# Patient Record
Sex: FEMALE | NOT HISPANIC OR LATINO | Employment: UNEMPLOYED | URBAN - METROPOLITAN AREA
[De-identification: names, ages, dates, MRNs, and addresses within clinical notes are randomized per-mention and may not be internally consistent; named-entity substitution may affect disease eponyms.]

---

## 2022-07-26 ENCOUNTER — TRANSCRIBE ORDERS (OUTPATIENT)
Dept: ADMINISTRATIVE | Facility: HOSPITAL | Age: 62
End: 2022-07-26

## 2022-07-26 DIAGNOSIS — Z13.820 SCREENING FOR OSTEOPOROSIS: Primary | ICD-10-CM

## 2022-08-01 ENCOUNTER — TRANSCRIBE ORDERS (OUTPATIENT)
Dept: ADMINISTRATIVE | Facility: HOSPITAL | Age: 62
End: 2022-08-01

## 2022-08-01 DIAGNOSIS — H90.5 SENSORINEURAL HEARING LOSS (SNHL) OF RIGHT EAR, UNSPECIFIED HEARING STATUS ON CONTRALATERAL SIDE: Primary | ICD-10-CM

## 2022-08-22 ENCOUNTER — HOSPITAL ENCOUNTER (OUTPATIENT)
Dept: BONE DENSITY | Facility: HOSPITAL | Age: 62
Discharge: HOME | End: 2022-08-22
Admitting: NURSE PRACTITIONER

## 2022-08-22 DIAGNOSIS — Z13.820 SCREENING FOR OSTEOPOROSIS: ICD-10-CM

## 2022-08-22 PROCEDURE — 77080 DXA BONE DENSITY AXIAL: CPT

## 2022-09-07 ENCOUNTER — APPOINTMENT (OUTPATIENT)
Dept: CT IMAGING | Facility: HOSPITAL | Age: 62
End: 2022-09-07

## 2022-09-19 ENCOUNTER — HOSPITAL ENCOUNTER (OUTPATIENT)
Dept: CT IMAGING | Facility: HOSPITAL | Age: 62
Discharge: HOME | End: 2022-09-19
Admitting: OTOLARYNGOLOGY

## 2022-09-19 DIAGNOSIS — H90.5 SENSORINEURAL HEARING LOSS (SNHL) OF RIGHT EAR, UNSPECIFIED HEARING STATUS ON CONTRALATERAL SIDE: ICD-10-CM

## 2022-09-19 PROCEDURE — 70480 CT ORBIT/EAR/FOSSA W/O DYE: CPT

## 2022-12-27 ENCOUNTER — TRANSCRIBE ORDERS (OUTPATIENT)
Dept: ADMINISTRATIVE | Facility: HOSPITAL | Age: 62
End: 2022-12-27

## 2022-12-27 DIAGNOSIS — Z12.2 SCREENING FOR LUNG CANCER: Primary | ICD-10-CM

## 2023-01-09 ENCOUNTER — HOSPITAL ENCOUNTER (OUTPATIENT)
Dept: CT IMAGING | Facility: HOSPITAL | Age: 63
Discharge: HOME | End: 2023-01-09
Admitting: NURSE PRACTITIONER
Payer: MEDICARE

## 2023-01-09 DIAGNOSIS — Z12.2 SCREENING FOR LUNG CANCER: ICD-10-CM

## 2023-01-09 PROCEDURE — 71271 CT THORAX LUNG CANCER SCR C-: CPT

## 2023-04-18 ENCOUNTER — OFFICE VISIT (OUTPATIENT)
Dept: SLEEP MEDICINE | Facility: HOSPITAL | Age: 63
End: 2023-04-18
Payer: MEDICARE

## 2023-04-18 VITALS
DIASTOLIC BLOOD PRESSURE: 92 MMHG | SYSTOLIC BLOOD PRESSURE: 134 MMHG | WEIGHT: 226.6 LBS | OXYGEN SATURATION: 96 % | HEART RATE: 97 BPM | BODY MASS INDEX: 41.7 KG/M2 | HEIGHT: 62 IN

## 2023-04-18 DIAGNOSIS — E11.649 TYPE 2 DIABETES MELLITUS WITH HYPOGLYCEMIA WITHOUT COMA, WITH LONG-TERM CURRENT USE OF INSULIN: ICD-10-CM

## 2023-04-18 DIAGNOSIS — Z79.4 TYPE 2 DIABETES MELLITUS WITH HYPOGLYCEMIA WITHOUT COMA, WITH LONG-TERM CURRENT USE OF INSULIN: ICD-10-CM

## 2023-04-18 DIAGNOSIS — G47.34 SLEEP RELATED HYPOXIA: ICD-10-CM

## 2023-04-18 DIAGNOSIS — I10 BENIGN ESSENTIAL HTN: ICD-10-CM

## 2023-04-18 DIAGNOSIS — G47.33 OSA ON CPAP: Primary | ICD-10-CM

## 2023-04-18 DIAGNOSIS — J45.909 UNCOMPLICATED ASTHMA, UNSPECIFIED ASTHMA SEVERITY, UNSPECIFIED WHETHER PERSISTENT: ICD-10-CM

## 2023-04-18 DIAGNOSIS — Z99.89 OSA ON CPAP: Primary | ICD-10-CM

## 2023-04-18 DIAGNOSIS — E66.01 OBESITY, MORBID, BMI 40.0-49.9: ICD-10-CM

## 2023-04-18 PROCEDURE — G0463 HOSPITAL OUTPT CLINIC VISIT: HCPCS

## 2023-04-18 PROCEDURE — 3075F SYST BP GE 130 - 139MM HG: CPT | Performed by: INTERNAL MEDICINE

## 2023-04-18 PROCEDURE — 3080F DIAST BP >= 90 MM HG: CPT | Performed by: INTERNAL MEDICINE

## 2023-04-18 RX ORDER — CHLORAL HYDRATE 500 MG
CAPSULE ORAL
COMMUNITY

## 2023-04-18 RX ORDER — DIPHENHYDRAMINE HCL 25 MG
1 CAPSULE ORAL EVERY 6 HOURS PRN
COMMUNITY

## 2023-04-18 RX ORDER — DULOXETIN HYDROCHLORIDE 30 MG/1
1 CAPSULE, DELAYED RELEASE ORAL DAILY
COMMUNITY

## 2023-04-18 RX ORDER — ALBUTEROL SULFATE 90 UG/1
2 AEROSOL, METERED RESPIRATORY (INHALATION) EVERY 6 HOURS PRN
COMMUNITY
Start: 2023-03-22

## 2023-04-18 RX ORDER — MELATONIN
25 DAILY
COMMUNITY

## 2023-04-18 RX ORDER — GLIPIZIDE 10 MG/1
10 TABLET ORAL
COMMUNITY
Start: 2023-03-28

## 2023-04-18 RX ORDER — LOSARTAN POTASSIUM 100 MG/1
1 TABLET ORAL DAILY
COMMUNITY
Start: 2023-04-01

## 2023-04-18 RX ORDER — BUDESONIDE 0.5 MG/2ML
0.5 INHALANT ORAL
COMMUNITY
Start: 2022-11-15

## 2023-04-18 RX ORDER — UBIDECARENONE 75 MG
CAPSULE ORAL
COMMUNITY

## 2023-04-18 RX ORDER — IPRATROPIUM BROMIDE AND ALBUTEROL SULFATE 2.5; .5 MG/3ML; MG/3ML
SOLUTION RESPIRATORY (INHALATION)
COMMUNITY
Start: 2023-01-09

## 2023-04-18 RX ORDER — TIOTROPIUM BROMIDE 18 UG/1
CAPSULE ORAL; RESPIRATORY (INHALATION) DAILY
COMMUNITY
Start: 2023-04-05

## 2023-04-18 RX ORDER — TRAZODONE HYDROCHLORIDE 100 MG/1
100 TABLET ORAL
COMMUNITY
Start: 2023-03-29

## 2023-04-18 RX ORDER — FLUOXETINE HYDROCHLORIDE 20 MG/1
20 CAPSULE ORAL EVERY MORNING
COMMUNITY
Start: 2023-02-09

## 2023-04-18 RX ORDER — CYCLOBENZAPRINE HCL 10 MG
10 TABLET ORAL
COMMUNITY
Start: 2023-04-06 | End: 2023-05-06

## 2023-04-18 RX ORDER — GOLIMUMAB 100 MG/ML
INJECTION, SOLUTION SUBCUTANEOUS
COMMUNITY

## 2023-04-18 RX ORDER — DENOSUMAB 60 MG/ML
INJECTION SUBCUTANEOUS
COMMUNITY
Start: 2023-01-16

## 2023-04-18 RX ORDER — LOVASTATIN 20 MG/1
1 TABLET ORAL DAILY
Qty: 30 TABLET | Refills: 11 | COMMUNITY
Start: 2022-09-07 | End: 2023-09-07

## 2023-04-18 RX ORDER — GABAPENTIN 300 MG/1
CAPSULE ORAL
COMMUNITY
Start: 2023-02-08

## 2023-04-18 RX ORDER — LORATADINE 10 MG/1
10 TABLET ORAL DAILY
COMMUNITY

## 2023-04-18 RX ORDER — OMEPRAZOLE 20 MG/1
1 CAPSULE, DELAYED RELEASE ORAL DAILY
COMMUNITY
Start: 2023-03-28

## 2023-04-18 RX ORDER — CLONAZEPAM 0.5 MG/1
0.5 TABLET ORAL DAILY PRN
COMMUNITY
Start: 2023-04-12

## 2023-04-18 RX ORDER — BUDESONIDE AND FORMOTEROL FUMARATE DIHYDRATE 160; 4.5 UG/1; UG/1
2 AEROSOL RESPIRATORY (INHALATION)
COMMUNITY
Start: 2023-01-24

## 2023-04-18 NOTE — PROGRESS NOTES
Sleep Consultation    Patient Name: Estelle Ruby  Age/Sex: 62 y.o. female  : 1960  MRN: [id number]    Date of Encounter Visit: 2023  Encounter Provider: Gael Villa MD  Referring Provider: ELEUTERIO Ng  Place of Service: [facility]  Patient Care Team:  Elizabeth Soriano APRN as PCP - General (Nurse Practitioner)    Subjective:     Reason for Consult: Obstructive sleep apnea    History of Present Illness:  Estelle Ruby is a 62 y.o. female is here for evaluation of KRISTOFER due to suggestive symptoms.  She was evaluated for KRISTOFER in . And repeated in , 2017 while in [place] and was on Auto CPAP   I had a copy of the 2017 HST that showed AHI of 40 with sleep related hypoxemia with 25% of the recording time spent below 90%  On Auto CPAP 5-15   She did gain 10 pounds since her last home sleep study   Patient complains of daytime fatigue and sleepiness with an [place] Sleepiness Scale (ESS) of 5.  Patient complains of daytime fatigue, subjective sleepiness, with difficulty driving because of the sleepiness  The snoring is very loud, in all position, and with witnessed apnea and episode of waking up choking at night.  Patient has some leg jerking at night but no restless leg syndrome reported  Positive night sweats  Positive for some vivid dreams  Sleep is fragmented with difficulty initiating and maintaining sleep.  Nocturia was 2 trip to the bathroom on the average per night  Denies any symptoms of restless leg syndrome.   Patient denies any cataplexy, sleep paralysis or other symptoms to suggest narcolepsy.  Patient denies any parasomnias.  Denies any history of seizure disorder or recent head trauma.  Patient spends adequate amount of time in bed with no evidence of sleep restriction or improper sleep hygiene.  Bedtime is around midnight wake up time is 8 in the morning with sleep onset within 20 minutes, patient wakes up refreshed.  Patient consumes 2 caffeinated beverages  per day, positive for tobacco until the age of 48  No alcohol consumption  Comorbidities include: Hypertension, anxiety and depression, insomnia, diabetes mellitus, asthma/COPD, acid reflux, arthritis    Review of Systems:   A twelve-system review was conducted and was negative except for the following: Frequent urination, hoarseness, nasal congestion and postnasal drainage, temporomandibular joint pain, anxiety and depression, heartburn, excessive thirst and swelling.        Past Medical History:  Past Medical History:   Diagnosis Date   • Acid reflux    • Arthritis    • Asthma    • Diabetes mellitus    • Hypertension    • Mental disorder     Anxiety, Depression       History reviewed. No pertinent surgical history.    Home Medications:     Current Outpatient Medications:   •  albuterol sulfate  (90 Base) MCG/ACT inhaler, Inhale 2 puffs Every 6 (Six) Hours As Needed., Disp: , Rfl:   •  ascorbic acid (VITAMIN C) 1000 MG tablet, Take 1 tablet by mouth Daily., Disp: , Rfl:   •  budesonide (PULMICORT) 0.5 MG/2ML nebulizer solution, Inhale 2 mL., Disp: , Rfl:   •  budesonide-formoterol (SYMBICORT) 160-4.5 MCG/ACT inhaler, Inhale 2 puffs., Disp: , Rfl:   •  Calcium-Magnesium-Vitamin D - MG-MG-UNIT tablet sustained-release 24 hour, Take  by mouth., Disp: , Rfl:   •  Cariprazine HCl (VRAYLAR) 1.5 MG capsule capsule, Take 1 capsule by mouth Daily., Disp: , Rfl:   •  cholecalciferol (VITAMIN D3) 25 MCG (1000 UT) tablet, Take 1 tablet by mouth Daily., Disp: , Rfl:   •  clonazePAM (KlonoPIN) 0.5 MG tablet, Take 1 tablet by mouth Daily As Needed., Disp: , Rfl:   •  cyclobenzaprine (FLEXERIL) 10 MG tablet, Take 1 tablet by mouth., Disp: , Rfl:   •  diphenhydrAMINE (BENADRYL) 25 mg capsule, Take 1 capsule by mouth Every 6 (Six) Hours As Needed., Disp: , Rfl:   •  DULoxetine (CYMBALTA) 30 MG capsule, Take 1 capsule by mouth Daily., Disp: , Rfl:   •  FLUoxetine (PROzac) 20 MG capsule, Take 1 capsule by mouth  "Every Morning., Disp: , Rfl:   •  fluticasone (VERAMYST) 27.5 MCG/SPRAY nasal spray, 2 sprays into the nostril(s) as directed by provider Daily., Disp: , Rfl:   •  gabapentin (NEURONTIN) 300 MG capsule, TAKE 1 CAPSULE BY MOUTH TWICE DAILY. MAX DAILY AMOUNT: 600 MG, Disp: , Rfl:   •  glipizide (GLUCOTROL) 10 MG tablet, Take 1 tablet by mouth 2 (Two) Times a Day Before Meals., Disp: , Rfl:   •  Golimumab (Simponi) 100 MG/ML solution auto-injector, Inject  under the skin into the appropriate area as directed., Disp: , Rfl:   •  insulin NPH (humuLIN N,novoLIN N) 100 UNIT/ML injection, Inject  under the skin into the appropriate area as directed., Disp: , Rfl:   •  ipratropium-albuterol (DUO-NEB) 0.5-2.5 mg/3 ml nebulizer, , Disp: , Rfl:   •  IRON, FERROUS SULFATE, PO, Take  by mouth., Disp: , Rfl:   •  loratadine (CLARITIN) 10 MG tablet, Take 1 tablet by mouth Daily., Disp: , Rfl:   •  losartan (COZAAR) 100 MG tablet, Take 1 tablet by mouth Daily., Disp: , Rfl:   •  lovastatin (MEVACOR) 20 MG tablet, Take 1 tablet by mouth Daily., Disp: 30 tablet, Rfl: 11  •  MAGNESIUM PO, Take  by mouth., Disp: , Rfl:   •  metFORMIN (GLUCOPHAGE) 1000 MG tablet, Take 1 tablet by mouth 2 (Two) Times a Day With Meals., Disp: , Rfl:   •  Omega-3 Fatty Acids (fish oil) 1000 MG capsule capsule, Take  by mouth., Disp: , Rfl:   •  omeprazole (priLOSEC) 20 MG capsule, Take 1 capsule by mouth Daily., Disp: , Rfl:   •  Prolia 60 MG/ML solution prefilled syringe syringe, , Disp: , Rfl:   •  Spiriva HandiHaler 18 MCG per inhalation capsule, Place  into inhaler and inhale Daily., Disp: , Rfl:   •  traZODone (DESYREL) 100 MG tablet, Take 1 tablet by mouth every night at bedtime., Disp: , Rfl:   •  vitamin B-12 (CYANOCOBALAMIN) 100 MCG tablet, Take  by mouth., Disp: , Rfl:     Allergies:  Allergies   Allergen Reactions   • Sulfa Antibiotics Other (See Comments)     Pt reports \"really bad yeast infection\"       Past Social History:  Social History " "    Socioeconomic History   • Marital status: Unknown   Tobacco Use   • Smoking status: Former     Packs/day: 1.00     Types: Cigarettes   • Smokeless tobacco: Never   Vaping Use   • Vaping Use: Never used   Substance and Sexual Activity   • Alcohol use: Never   • Drug use: Not Currently   • Sexual activity: Defer       Past Family History:  History reviewed. No pertinent family history.  Obesity and hypothyroidism  Objective:        Vital Signs:   Visit Vitals  /92   Pulse 97   Ht 157.5 cm (62\")   Wt 103 kg (226 lb 9.6 oz)   SpO2 96%   BMI 41.45 kg/m²     Wt Readings from Last 3 Encounters:   04/18/23 103 kg (226 lb 9.6 oz)     Neck Circumference: 16 inches    Physical Exam:   GEN:  No acute distress, alert, cooperative, well developed   EYES:   Sclerae clear. No icterus. PERRL. Normal EOM  ENT:   External ears/nose normal, no oral lesions, no thrush, mucous membranes moist, Septum midline. Mallampati IV airway. Large tongue, large uvula , short obese neck   NECK:  Supple, midline trachea, no JVD  LUNGS: Normal chest on inspection, CTAB, no wheezes. No rhonchi. No crackles. Respirations regular, even and unlabored.   CV:  Regular rhythm and rate. Normal S1/S2. No murmurs, gallops, or rubs noted.  ABD:  Soft, nontender and nondistended. Normal bowel sounds. No guarding  EXT:  Moves all extremities well. No cyanosis. No redness. Trace  edema.   Skin: Dry, intact, no bleeding      Diagnostic Data:  HST 9/28/2017 : AHI 40.2, 25% recording time below 90% saturation   Compliance download 5-15 , average nightly use 9.27 hours, residual AHI 0.5, average air leak 32 lpm   Assessment and Plan:       ICD-10-CM ICD-9-CM   1. KRISTOFER on CPAP  G47.33 327.23    Z99.89 V46.8   2. Sleep related hypoxia  G47.34 327.24   3. Obesity, morbid, BMI 40.0-49.9  E66.01 278.01   4. Uncomplicated asthma, unspecified asthma severity, unspecified whether persistent  J45.909 493.90   5. Type 2 diabetes mellitus with hypoglycemia without coma, " with long-term current use of insulin  E11.649 250.80    Z79.4 251.2     V58.67   6. Benign essential HTN  I10 401.1       Recommendations:        Patient has severe case of obstructive sleep apnea with hypoxemia  Patient is on CPAP and it is well controlled on the CPAP, she is compliant with the treatment and it is effective, she is feeling better and she plans to continue to use it  No pressure adjustment necessary  Patient is to have order for CPAP supplies which will be provided today  Patient is to work on the weight loss  We will follow-up on a yearly basis or sooner as needed       Orders Placed This Encounter   Procedures   • PAP Therapy     No orders of the defined types were placed in this encounter.     Return in about 1 year (around 4/18/2024).    Gael Villa MD   [facility]   04/18/23  15:12 EDT    Dictated utilizing Dragon dictation        Plan

## 2023-09-27 ENCOUNTER — OFFICE VISIT (OUTPATIENT)
Dept: CARDIOLOGY | Facility: CLINIC | Age: 63
End: 2023-09-27
Payer: MEDICARE

## 2023-09-27 VITALS
BODY MASS INDEX: 41.04 KG/M2 | SYSTOLIC BLOOD PRESSURE: 117 MMHG | WEIGHT: 223 LBS | HEART RATE: 71 BPM | HEIGHT: 62 IN | DIASTOLIC BLOOD PRESSURE: 64 MMHG

## 2023-09-27 DIAGNOSIS — I10 BENIGN ESSENTIAL HTN: ICD-10-CM

## 2023-09-27 DIAGNOSIS — I50.32 CHRONIC DIASTOLIC HEART FAILURE: Primary | ICD-10-CM

## 2023-09-27 RX ORDER — METOPROLOL SUCCINATE 50 MG/1
50 TABLET, EXTENDED RELEASE ORAL EVERY EVENING
Qty: 90 TABLET | Refills: 3 | Status: SHIPPED | OUTPATIENT
Start: 2023-09-27

## 2023-09-27 RX ORDER — FOLIC ACID 1 MG/1
1 TABLET ORAL DAILY
Qty: 30 TABLET | Refills: 3 | COMMUNITY
Start: 2023-08-03 | End: 2023-11-29

## 2023-09-27 RX ORDER — BUMETANIDE 1 MG/1
1 TABLET ORAL DAILY
Qty: 90 TABLET | Refills: 3 | Status: SHIPPED | OUTPATIENT
Start: 2023-09-27 | End: 2024-09-21

## 2023-09-27 RX ORDER — BUMETANIDE 1 MG/1
1 TABLET ORAL DAILY
Qty: 30 TABLET | Refills: 3 | COMMUNITY
Start: 2023-08-03 | End: 2023-09-27 | Stop reason: SDUPTHER

## 2023-09-27 RX ORDER — POTASSIUM CHLORIDE 750 MG/1
10 TABLET, FILM COATED, EXTENDED RELEASE ORAL 2 TIMES DAILY
COMMUNITY
End: 2023-09-27

## 2023-09-27 RX ORDER — CYCLOBENZAPRINE HCL 10 MG
1 TABLET ORAL 3 TIMES DAILY PRN
COMMUNITY
Start: 2023-06-25

## 2023-09-27 RX ORDER — METOPROLOL SUCCINATE 50 MG/1
50 TABLET, EXTENDED RELEASE ORAL EVERY EVENING
COMMUNITY
End: 2023-09-27 | Stop reason: SDUPTHER

## 2023-09-27 NOTE — ASSESSMENT & PLAN NOTE
Recent admission for heart failure exacerbation.  Labs done as outpatient showed normal proBNP.  Echocardiogram showed normal LV function and SPECT stress test did not show reversible ischemia.  Findings of the test and nature of the condition explained in detail with the patient.  Since recent labs showed high potassium levels, we will discontinue potassium supplementation.  Recommend to continue Bumex 1 mg daily along with metoprolol succinate 50 mg daily.  Of note, she was noted to have nonsustained ventricular tachycardia during recent hospital stay hence it is important to continue metoprolol for now.  After stopping potassium, she will need repeat basic metabolic panel in 2 weeks, and patient wants to do it with her PCP visit.  We will follow the results and make further adjustments.

## 2023-09-27 NOTE — LETTER
September 27, 2023     ELEUTERIO Ng  [address]  [place] [address]    Patient: Estelle Ruby   YOB: 1960   Date of Visit: 9/27/2023       Dear ELEUTERIO Ng    Estelle Ruby was in my office today. Below is a copy of my note.    If you have questions, please do not hesitate to call me. I look forward to following Estelle along with you.         Sincerely,        Bassem Rodriguez MD        CC: ELEUTERIO Ng      CARDIOLOGY INITIAL CONSULT       Chief Complaint  Shortness of Breath and Congestive Heart Failure      Reason for consultation  Congestive heart failure, unspecified heart failure chronicity, unspecified heart failure type    Subjective            Estelle Ruby presents to [facility] CARDIOLOGY  History of Present Illness    Ms. Ruby is referred for cardiac evaluation due to recent diagnosis of congestive heart failure.  She has COPD/asthma and obstructive sleep apnea.  She presented to [facility] in early August after getting an infusion for osteoporosis.  Per patient, she had an allergic reaction, resulting in shortness of breath.  She was noted to be volume overloaded.  She was admitted and was started on IV diuretics.  proBNP was elevated.  Echocardiogram showed diastolic dysfunction.  Symptoms improved and she was eventually discharged home on Bumex, potassium and metoprolol.  She had repeat labs done with PCP office which showed normal proBNP since discharge.    Patient reports that she had low sodium levels for the past several years, which is being closely monitored.  Currently she feels back to normal.  She has mild shortness of breath with activities which is at her baseline.  Pedal edema completely subsided.  Denies any recent episodes of chest pain.      Past History:    COPD/asthma  Obstructive sleep apnea on CPAP  Mixed hyperlipidemia  Essential hypertension  Diabetes mellitus    Medical History:  Past Medical History:    Diagnosis Date   • Acid reflux    • Arthritis    • Asthma    • Diabetes mellitus    • Hypertension    • Mental disorder     Anxiety, Depression       Surgical History: has no past surgical history on file.     Family History: family history includes Cancer in her father; Stroke in her mother.     Social History: reports that she has quit smoking. Her smoking use included cigarettes. She smoked an average of 1 pack per day. She has never used smokeless tobacco. She reports that she does not currently use drugs. She reports that she does not drink alcohol.    Allergies: Sulfa antibiotics    Current Outpatient Medications on File Prior to Visit   Medication Sig   • albuterol sulfate  (90 Base) MCG/ACT inhaler Inhale 2 puffs Every 6 (Six) Hours As Needed.   • ascorbic acid (VITAMIN C) 1000 MG tablet Take 1 tablet by mouth Daily.   • budesonide (PULMICORT) 0.5 MG/2ML nebulizer solution Inhale 2 mL.   • budesonide-formoterol (SYMBICORT) 160-4.5 MCG/ACT inhaler Inhale 2 puffs.   • Calcium-Magnesium-Vitamin D - MG-MG-UNIT tablet sustained-release 24 hour Take  by mouth.   • Cariprazine HCl (VRAYLAR) 1.5 MG capsule capsule Take 1 capsule by mouth Daily.   • cholecalciferol (VITAMIN D3) 25 MCG (1000 UT) tablet Take 1 tablet by mouth Daily.   • clonazePAM (KlonoPIN) 0.5 MG tablet Take 1 tablet by mouth Daily As Needed.   • cyclobenzaprine (FLEXERIL) 10 MG tablet Take 1 tablet by mouth 3 (Three) Times a Day As Needed.   • diphenhydrAMINE (BENADRYL) 25 mg capsule Take 1 capsule by mouth Every 6 (Six) Hours As Needed.   • DULoxetine (CYMBALTA) 30 MG capsule Take 1 capsule by mouth Daily.   • FLUoxetine (PROzac) 20 MG capsule Take 1 capsule by mouth Every Morning.   • fluticasone (VERAMYST) 27.5 MCG/SPRAY nasal spray 2 sprays into the nostril(s) as directed by provider Daily.   • folic acid (FOLVITE) 1 MG tablet Take 1 tablet by mouth Daily.   • gabapentin (NEURONTIN) 300 MG capsule TAKE 1 CAPSULE BY MOUTH  "TWICE DAILY. MAX DAILY AMOUNT: 600 MG   • glipizide (GLUCOTROL) 10 MG tablet Take 1 tablet by mouth 2 (Two) Times a Day Before Meals.   • insulin NPH (humuLIN N,novoLIN N) 100 UNIT/ML injection Inject  under the skin into the appropriate area as directed.   • IRON, FERROUS SULFATE, PO Take  by mouth.   • losartan (COZAAR) 100 MG tablet Take 1 tablet by mouth Daily.   • MAGNESIUM PO Take  by mouth.   • metFORMIN (GLUCOPHAGE) 1000 MG tablet Take 1 tablet by mouth 2 (Two) Times a Day With Meals.   • Omega-3 Fatty Acids (fish oil) 1000 MG capsule capsule Take  by mouth.   • omeprazole (priLOSEC) 20 MG capsule Take 1 capsule by mouth Daily.   • Prolia 60 MG/ML solution prefilled syringe syringe    • traZODone (DESYREL) 100 MG tablet Take 1 tablet by mouth every night at bedtime.   • vitamin B-12 (CYANOCOBALAMIN) 100 MCG tablet Take  by mouth.   • metoprolol succinate XL (TOPROL-XL) 50 MG 24 hr tablet Take 1 tablet by mouth Every Evening.   • potassium chloride 10 MEQ CR tablet Take 1 tablet by mouth 2 (Two) Times a Day.       Review of Systems   Constitutional:  Positive for fatigue. Negative for unexpected weight gain and unexpected weight loss.   Eyes:  Negative for double vision.   Respiratory:  Positive for shortness of breath. Negative for cough and wheezing.    Cardiovascular:  Negative for chest pain, palpitations and leg swelling.   Gastrointestinal:  Negative for abdominal pain, nausea and vomiting.   Endocrine: Negative for cold intolerance, heat intolerance, polydipsia and polyuria.   Musculoskeletal:  Negative for arthralgias and back pain.   Skin:  Negative for color change.   Neurological:  Negative for dizziness, syncope, weakness and headache.   Hematological:  Does not bruise/bleed easily.      Objective     /64   Pulse 71   Ht 157.5 cm (62\")   Wt 101 kg (223 lb)   BMI 40.79 kg/m²       Physical Exam  Constitutional:       General: She is awake. She is not in acute distress.     Appearance: " Normal appearance.   Eyes:      Extraocular Movements: Extraocular movements intact.      Pupils: Pupils are equal, round, and reactive to light.   Neck:      Thyroid: No thyromegaly.      Vascular: No carotid bruit or JVD.   Cardiovascular:      Rate and Rhythm: Normal rate and regular rhythm.      Chest Wall: PMI is not displaced.      Heart sounds: Normal heart sounds, S1 normal and S2 normal. No murmur heard.    No friction rub. No gallop. No S3 or S4 sounds.   Pulmonary:      Effort: Pulmonary effort is normal. No respiratory distress.      Breath sounds: Normal breath sounds. No wheezing, rhonchi or rales.   Abdominal:      General: Bowel sounds are normal.      Palpations: Abdomen is soft.      Tenderness: There is no abdominal tenderness.   Musculoskeletal:      Cervical back: Neck supple.      Right lower leg: No edema.      Left lower leg: No edema.   Skin:     Nails: There is no clubbing.   Neurological:      General: No focal deficit present.      Mental Status: She is alert and oriented to person, place, and time.         Result Review :     The following data was reviewed by: Bassem Rodriguez MD on 09/27/2023:    CMP          4/21/2023    11:10   CMP   Potassium 5.3          Details          This result is from an external source.             CBC          4/17/2023    09:56 7/17/2023    09:44 8/10/2023    13:41   CBC   WBC 9.09     10.16     14.89       RBC 4.37     4.48     4.81       Hemoglobin 11.5     11.9     12.7       Hematocrit 38.1     37.6     41.5       MCV 87.2     83.9     86.3       MCH 26.3     26.6     26.4       MCHC 30.2     31.6     30.6       RDW 15.4     14.6     15.4       Platelets 336     319     509          Details          This result is from an external source.             TSH          7/30/2023    06:18   TSH   TSH 0.596          Details          This result is from an external source.                  Data reviewed: Cardiology studies            SPECT stress test done on  9/11/2023 at [facility] showed    Myocardial perfusion imaging is normal  No evidence of ischemia or infarct  Normal LV dimensions  Normal systolic LV function with estimated LV ejection fraction 73%              Assessment and Plan        Diagnoses and all orders for this visit:    1. Chronic diastolic heart failure (Primary)  Assessment & Plan:  Recent admission for heart failure exacerbation.  Labs done as outpatient showed normal proBNP.  Echocardiogram showed normal LV function and SPECT stress test did not show reversible ischemia.  Findings of the test and nature of the condition explained in detail with the patient.  Since recent labs showed high potassium levels, we will discontinue potassium supplementation.  Recommend to continue Bumex 1 mg daily along with metoprolol succinate 50 mg daily.  Of note, she was noted to have nonsustained ventricular tachycardia during recent hospital stay hence it is important to continue metoprolol for now.  After stopping potassium, she will need repeat basic metabolic panel in 2 weeks, and patient wants to do it with her PCP visit.  We will follow the results and make further adjustments.    Orders:  -     bumetanide (BUMEX) 1 MG tablet; Take 1 tablet by mouth Daily for 360 days.  Dispense: 90 tablet; Refill: 3    2. Benign essential HTN  Assessment & Plan:  Blood pressure very well controlled.  Continue metoprolol and losartan at the current dose.    Orders:  -     metoprolol succinate XL (TOPROL-XL) 50 MG 24 hr tablet; Take 1 tablet by mouth Every Evening.  Dispense: 90 tablet; Refill: 3            I spent 28 minutes caring for Estelle on this date of service. This time includes time spent by me in the following activities:reviewing tests, obtaining and/or reviewing a separately obtained history, performing a medically appropriate examination and/or evaluation , ordering medications, tests, or procedures, and documenting information in the medical record    Follow Up      Return in about 3 months (around 12/27/2023) for Next scheduled follow up.    Patient was given instructions and counseling regarding her condition or for health maintenance advice. Please see specific information pulled into the AVS if appropriate.

## 2023-09-27 NOTE — PROGRESS NOTES
CARDIOLOGY INITIAL CONSULT       Chief Complaint  Shortness of Breath and Congestive Heart Failure      Reason for consultation  Congestive heart failure, unspecified heart failure chronicity, unspecified heart failure type    Subjective            Estelle Ruby presents to [facility] CARDIOLOGY  History of Present Illness    Ms. Ruby is referred for cardiac evaluation due to recent diagnosis of congestive heart failure.  She has COPD/asthma and obstructive sleep apnea.  She presented to [facility] in early August after getting an infusion for osteoporosis.  Per patient, she had an allergic reaction, resulting in shortness of breath.  She was noted to be volume overloaded.  She was admitted and was started on IV diuretics.  proBNP was elevated.  Echocardiogram showed diastolic dysfunction.  Symptoms improved and she was eventually discharged home on Bumex, potassium and metoprolol.  She had repeat labs done with PCP office which showed normal proBNP since discharge.    Patient reports that she had low sodium levels for the past several years, which is being closely monitored.  Currently she feels back to normal.  She has mild shortness of breath with activities which is at her baseline.  Pedal edema completely subsided.  Denies any recent episodes of chest pain.      Past History:    COPD/asthma  Obstructive sleep apnea on CPAP  Mixed hyperlipidemia  Essential hypertension  Diabetes mellitus    Medical History:  Past Medical History:   Diagnosis Date    Acid reflux     Arthritis     Asthma     Diabetes mellitus     Hypertension     Mental disorder     Anxiety, Depression       Surgical History: has no past surgical history on file.     Family History: family history includes Cancer in her father; Stroke in her mother.     Social History: reports that she has quit smoking. Her smoking use included cigarettes. She smoked an average of 1 pack per day. She has never used smokeless tobacco. She  reports that she does not currently use drugs. She reports that she does not drink alcohol.    Allergies: Sulfa antibiotics    Current Outpatient Medications on File Prior to Visit   Medication Sig    albuterol sulfate  (90 Base) MCG/ACT inhaler Inhale 2 puffs Every 6 (Six) Hours As Needed.    ascorbic acid (VITAMIN C) 1000 MG tablet Take 1 tablet by mouth Daily.    budesonide (PULMICORT) 0.5 MG/2ML nebulizer solution Inhale 2 mL.    budesonide-formoterol (SYMBICORT) 160-4.5 MCG/ACT inhaler Inhale 2 puffs.    Calcium-Magnesium-Vitamin D - MG-MG-UNIT tablet sustained-release 24 hour Take  by mouth.    Cariprazine HCl (VRAYLAR) 1.5 MG capsule capsule Take 1 capsule by mouth Daily.    cholecalciferol (VITAMIN D3) 25 MCG (1000 UT) tablet Take 1 tablet by mouth Daily.    clonazePAM (KlonoPIN) 0.5 MG tablet Take 1 tablet by mouth Daily As Needed.    cyclobenzaprine (FLEXERIL) 10 MG tablet Take 1 tablet by mouth 3 (Three) Times a Day As Needed.    diphenhydrAMINE (BENADRYL) 25 mg capsule Take 1 capsule by mouth Every 6 (Six) Hours As Needed.    DULoxetine (CYMBALTA) 30 MG capsule Take 1 capsule by mouth Daily.    FLUoxetine (PROzac) 20 MG capsule Take 1 capsule by mouth Every Morning.    fluticasone (VERAMYST) 27.5 MCG/SPRAY nasal spray 2 sprays into the nostril(s) as directed by provider Daily.    folic acid (FOLVITE) 1 MG tablet Take 1 tablet by mouth Daily.    gabapentin (NEURONTIN) 300 MG capsule TAKE 1 CAPSULE BY MOUTH TWICE DAILY. MAX DAILY AMOUNT: 600 MG    glipizide (GLUCOTROL) 10 MG tablet Take 1 tablet by mouth 2 (Two) Times a Day Before Meals.    insulin NPH (humuLIN N,novoLIN N) 100 UNIT/ML injection Inject  under the skin into the appropriate area as directed.    IRON, FERROUS SULFATE, PO Take  by mouth.    losartan (COZAAR) 100 MG tablet Take 1 tablet by mouth Daily.    MAGNESIUM PO Take  by mouth.    metFORMIN (GLUCOPHAGE) 1000 MG tablet Take 1 tablet by mouth 2 (Two) Times a Day With  "Meals.    Omega-3 Fatty Acids (fish oil) 1000 MG capsule capsule Take  by mouth.    omeprazole (priLOSEC) 20 MG capsule Take 1 capsule by mouth Daily.    Prolia 60 MG/ML solution prefilled syringe syringe     traZODone (DESYREL) 100 MG tablet Take 1 tablet by mouth every night at bedtime.    vitamin B-12 (CYANOCOBALAMIN) 100 MCG tablet Take  by mouth.    metoprolol succinate XL (TOPROL-XL) 50 MG 24 hr tablet Take 1 tablet by mouth Every Evening.    potassium chloride 10 MEQ CR tablet Take 1 tablet by mouth 2 (Two) Times a Day.       Review of Systems   Constitutional:  Positive for fatigue. Negative for unexpected weight gain and unexpected weight loss.   Eyes:  Negative for double vision.   Respiratory:  Positive for shortness of breath. Negative for cough and wheezing.    Cardiovascular:  Negative for chest pain, palpitations and leg swelling.   Gastrointestinal:  Negative for abdominal pain, nausea and vomiting.   Endocrine: Negative for cold intolerance, heat intolerance, polydipsia and polyuria.   Musculoskeletal:  Negative for arthralgias and back pain.   Skin:  Negative for color change.   Neurological:  Negative for dizziness, syncope, weakness and headache.   Hematological:  Does not bruise/bleed easily.      Objective     /64   Pulse 71   Ht 157.5 cm (62\")   Wt 101 kg (223 lb)   BMI 40.79 kg/m²       Physical Exam  Constitutional:       General: She is awake. She is not in acute distress.     Appearance: Normal appearance.   Eyes:      Extraocular Movements: Extraocular movements intact.      Pupils: Pupils are equal, round, and reactive to light.   Neck:      Thyroid: No thyromegaly.      Vascular: No carotid bruit or JVD.   Cardiovascular:      Rate and Rhythm: Normal rate and regular rhythm.      Chest Wall: PMI is not displaced.      Heart sounds: Normal heart sounds, S1 normal and S2 normal. No murmur heard.    No friction rub. No gallop. No S3 or S4 sounds.   Pulmonary:      Effort: " Pulmonary effort is normal. No respiratory distress.      Breath sounds: Normal breath sounds. No wheezing, rhonchi or rales.   Abdominal:      General: Bowel sounds are normal.      Palpations: Abdomen is soft.      Tenderness: There is no abdominal tenderness.   Musculoskeletal:      Cervical back: Neck supple.      Right lower leg: No edema.      Left lower leg: No edema.   Skin:     Nails: There is no clubbing.   Neurological:      General: No focal deficit present.      Mental Status: She is alert and oriented to person, place, and time.         Result Review :     The following data was reviewed by: Bassem Rodriguez MD on 09/27/2023:    CMP          4/21/2023    11:10   CMP   Potassium 5.3          Details          This result is from an external source.             CBC          4/17/2023    09:56 7/17/2023    09:44 8/10/2023    13:41   CBC   WBC 9.09     10.16     14.89       RBC 4.37     4.48     4.81       Hemoglobin 11.5     11.9     12.7       Hematocrit 38.1     37.6     41.5       MCV 87.2     83.9     86.3       MCH 26.3     26.6     26.4       MCHC 30.2     31.6     30.6       RDW 15.4     14.6     15.4       Platelets 336     319     509          Details          This result is from an external source.             TSH          7/30/2023    06:18   TSH   TSH 0.596          Details          This result is from an external source.                  Data reviewed: Cardiology studies            SPECT stress test done on 9/11/2023 at [facility] showed    Myocardial perfusion imaging is normal  No evidence of ischemia or infarct  Normal LV dimensions  Normal systolic LV function with estimated LV ejection fraction 73%              Assessment and Plan        Diagnoses and all orders for this visit:    1. Chronic diastolic heart failure (Primary)  Assessment & Plan:  Recent admission for heart failure exacerbation.  Labs done as outpatient showed normal proBNP.  Echocardiogram showed normal LV function  and SPECT stress test did not show reversible ischemia.  Findings of the test and nature of the condition explained in detail with the patient.  Since recent labs showed high potassium levels, we will discontinue potassium supplementation.  Recommend to continue Bumex 1 mg daily along with metoprolol succinate 50 mg daily.  Of note, she was noted to have nonsustained ventricular tachycardia during recent hospital stay hence it is important to continue metoprolol for now.  After stopping potassium, she will need repeat basic metabolic panel in 2 weeks, and patient wants to do it with her PCP visit.  We will follow the results and make further adjustments.    Orders:  -     bumetanide (BUMEX) 1 MG tablet; Take 1 tablet by mouth Daily for 360 days.  Dispense: 90 tablet; Refill: 3    2. Benign essential HTN  Assessment & Plan:  Blood pressure very well controlled.  Continue metoprolol and losartan at the current dose.    Orders:  -     metoprolol succinate XL (TOPROL-XL) 50 MG 24 hr tablet; Take 1 tablet by mouth Every Evening.  Dispense: 90 tablet; Refill: 3            I spent 28 minutes caring for Estelle on this date of service. This time includes time spent by me in the following activities:reviewing tests, obtaining and/or reviewing a separately obtained history, performing a medically appropriate examination and/or evaluation , ordering medications, tests, or procedures, and documenting information in the medical record    Follow Up     Return in about 3 months (around 12/27/2023) for Next scheduled follow up.    Patient was given instructions and counseling regarding her condition or for health maintenance advice. Please see specific information pulled into the AVS if appropriate.

## 2023-11-10 ENCOUNTER — TRANSCRIBE ORDERS (OUTPATIENT)
Dept: ADMINISTRATIVE | Facility: HOSPITAL | Age: 63
End: 2023-11-10
Payer: MEDICARE

## 2023-11-10 DIAGNOSIS — Z12.31 SCREENING MAMMOGRAM FOR BREAST CANCER: Primary | ICD-10-CM

## 2023-11-21 ENCOUNTER — TRANSCRIBE ORDERS (OUTPATIENT)
Dept: ADMINISTRATIVE | Facility: HOSPITAL | Age: 63
End: 2023-11-21
Payer: MEDICARE

## 2023-11-21 DIAGNOSIS — N64.52 DISCHARGE FROM LEFT NIPPLE: Primary | ICD-10-CM

## 2023-11-22 ENCOUNTER — TRANSCRIBE ORDERS (OUTPATIENT)
Dept: ADMINISTRATIVE | Facility: HOSPITAL | Age: 63
End: 2023-11-22
Payer: MEDICARE

## 2023-11-22 DIAGNOSIS — N64.52 DISCHARGE FROM LEFT NIPPLE: Primary | ICD-10-CM

## 2023-12-07 ENCOUNTER — HOSPITAL ENCOUNTER (OUTPATIENT)
Dept: ULTRASOUND IMAGING | Facility: HOSPITAL | Age: 63
Discharge: HOME | End: 2023-12-07
Payer: MEDICARE

## 2023-12-07 ENCOUNTER — HOSPITAL ENCOUNTER (OUTPATIENT)
Dept: MAMMOGRAPHY | Facility: HOSPITAL | Age: 63
Discharge: HOME | End: 2023-12-07
Payer: MEDICARE

## 2023-12-07 DIAGNOSIS — N64.52 DISCHARGE FROM LEFT NIPPLE: ICD-10-CM

## 2023-12-07 PROCEDURE — 77066 DX MAMMO INCL CAD BI: CPT

## 2023-12-07 PROCEDURE — G0279 TOMOSYNTHESIS, MAMMO: HCPCS

## 2023-12-07 PROCEDURE — 76642 ULTRASOUND BREAST LIMITED: CPT

## 2023-12-12 ENCOUNTER — TRANSCRIBE ORDERS (OUTPATIENT)
Dept: ADMINISTRATIVE | Facility: HOSPITAL | Age: 63
End: 2023-12-12
Payer: MEDICARE

## 2023-12-12 DIAGNOSIS — R92.8 ABNORMAL MAMMOGRAM OF LEFT BREAST: Primary | ICD-10-CM

## 2024-04-17 ENCOUNTER — OFFICE VISIT (OUTPATIENT)
Dept: CARDIOLOGY | Facility: CLINIC | Age: 64
End: 2024-04-17
Payer: MEDICARE

## 2024-04-17 VITALS
BODY MASS INDEX: 39.93 KG/M2 | HEIGHT: 62 IN | SYSTOLIC BLOOD PRESSURE: 116 MMHG | HEART RATE: 78 BPM | WEIGHT: 217 LBS | DIASTOLIC BLOOD PRESSURE: 68 MMHG

## 2024-04-17 DIAGNOSIS — I50.32 CHRONIC DIASTOLIC HEART FAILURE: Primary | ICD-10-CM

## 2024-04-17 DIAGNOSIS — I10 BENIGN ESSENTIAL HTN: ICD-10-CM

## 2024-04-17 RX ORDER — BUMETANIDE 0.5 MG/1
0.5 TABLET ORAL DAILY
Qty: 90 TABLET | Refills: 1 | Status: SHIPPED | OUTPATIENT
Start: 2024-04-17 | End: 2025-04-12

## 2024-04-17 RX ORDER — INSULIN GLARGINE 100 [IU]/ML
26 INJECTION, SOLUTION SUBCUTANEOUS DAILY
COMMUNITY
Start: 2024-04-10 | End: 2024-07-09

## 2024-04-17 RX ORDER — EMPAGLIFLOZIN AND METFORMIN HYDROCHLORIDE 12.5; 1 MG/1; MG/1
1 TABLET ORAL
COMMUNITY
Start: 2024-03-14

## 2024-04-17 RX ORDER — FUROSEMIDE 20 MG/1
20 TABLET ORAL DAILY
COMMUNITY
End: 2024-04-17

## 2024-04-17 NOTE — PROGRESS NOTES
CARDIOLOGY FOLLOW-UP PROGRESS NOTE        Chief Complaint  Hypertension, Follow-up, and Congestive Heart Failure    Subjective            Estelle Ruby presents to [facility] CARDIOLOGY  History of Present Illness      Ms Ruby is here for a follow-up visit.  She was previously seen on 9/27/2023 when she came to establish cardiac care.  Bumex and metoprolol were continued.    Today, patient reports feeling fine.  She has mild shortness of breath which is stable.  Denies having any pedal edema.  Recent labs showed creatinine above baseline.      Past History:    Chronic diastolic heart failure.  COPD/asthma  Obstructive sleep apnea on CPAP  Mixed hyperlipidemia  Essential hypertension  Diabetes mellitus    Medical History:  Past Medical History:   Diagnosis Date    Acid reflux     Arthritis     Asthma     Diabetes mellitus     Hypertension     Mental disorder     Anxiety, Depression       Surgical History: has no past surgical history on file.     Family History: family history includes Cancer in her father; Stroke in her mother.     Social History: reports that she has quit smoking. Her smoking use included cigarettes. She has never used smokeless tobacco. She reports that she does not currently use drugs. She reports that she does not drink alcohol.    Allergies: Reclast [zoledronic acid] and Sulfa antibiotics    Current Outpatient Medications on File Prior to Visit   Medication Sig    albuterol sulfate  (90 Base) MCG/ACT inhaler Inhale 2 puffs Every 6 (Six) Hours As Needed.    ascorbic acid (VITAMIN C) 1000 MG tablet Take 1 tablet by mouth Daily.    budesonide-formoterol (SYMBICORT) 160-4.5 MCG/ACT inhaler Inhale 2 puffs.    Calcium-Magnesium-Vitamin D - MG-MG-UNIT tablet sustained-release 24 hour Take  by mouth.    Cariprazine HCl (VRAYLAR) 1.5 MG capsule capsule Take 1 capsule by mouth Daily.    cholecalciferol (VITAMIN D3) 25 MCG (1000 UT) tablet Take 1 tablet by mouth Daily.  "   clonazePAM (KlonoPIN) 0.5 MG tablet Take 1 tablet by mouth Daily As Needed.    cyclobenzaprine (FLEXERIL) 10 MG tablet Take 1 tablet by mouth 3 (Three) Times a Day As Needed.    diphenhydrAMINE (BENADRYL) 25 mg capsule Take 1 capsule by mouth Every 6 (Six) Hours As Needed.    DULoxetine (CYMBALTA) 30 MG capsule Take 1 capsule by mouth Daily.    FLUoxetine (PROzac) 20 MG capsule Take 1 capsule by mouth Every Morning.    fluticasone (VERAMYST) 27.5 MCG/SPRAY nasal spray 2 sprays into the nostril(s) as directed by provider Daily.    gabapentin (NEURONTIN) 300 MG capsule TAKE 1 CAPSULE BY MOUTH TWICE DAILY. MAX DAILY AMOUNT: 600 MG    glipizide (GLUCOTROL) 10 MG tablet Take 1 tablet by mouth Daily As Needed.    Lantus SoloStar 100 UNIT/ML injection pen Inject 26 Units under the skin into the appropriate area as directed Daily.    losartan (COZAAR) 100 MG tablet Take 1 tablet by mouth Daily.    lovastatin (MEVACOR) 20 MG tablet Take 1 tablet by mouth Daily.    MAGNESIUM PO Take  by mouth.    metoprolol succinate XL (TOPROL-XL) 50 MG 24 hr tablet Take 1 tablet by mouth Every Evening.    Omega-3 Fatty Acids (fish oil) 1000 MG capsule capsule Take  by mouth.    omeprazole (priLOSEC) 20 MG capsule Take 1 capsule by mouth Daily.    Synjardy 12.5-1000 MG tablet Take 1 tablet by mouth 2 (Two) Times a Day Before Meals.    traZODone (DESYREL) 100 MG tablet Take 75 mg by mouth every night at bedtime.    vitamin B-12 (CYANOCOBALAMIN) 100 MCG tablet Take  by mouth.    [DISCONTINUED] bumetanide (BUMEX) 1 MG tablet Take 1 tablet by mouth Daily for 360 days.       Review of Systems   Respiratory:  Positive for shortness of breath (Mild). Negative for cough and wheezing.    Cardiovascular:  Negative for chest pain, palpitations and leg swelling.   Gastrointestinal:  Negative for nausea and vomiting.   Neurological:  Negative for dizziness and syncope.        Objective     /68   Pulse 78   Ht 157.5 cm (62\")   Wt 98.4 kg (217 " lb)   BMI 39.69 kg/m²       Physical Exam    General : Alert, awake, no acute distress  Neck : Supple, no carotid bruit, no jugular venous distention  CVS : Regular rate and rhythm, no murmur, rubs or gallops  Lungs: Clear to auscultation bilaterally, no crackles or rhonchi  Abdomen: Soft, nontender, bowel sounds heard in all 4 quadrants  Extremities: Warm, well-perfused, no pedal edema    Result Review :     The following data was reviewed by: Bassem Rodriguez MD on 04/17/2024:    Component 04/16/24 04/08/24 03/06/24 01/10/24 12/07/23 11/01/23   Sodium 133 Low  136 135 Low  129 Low  128 Low  128 Low    Potassium 4.8  4.2  5.0  4.7  5.2 High   4.7    Chloride 95 Low  96 Low  98 91 Low  88 Low  93 Low    Carbon Dioxide 23 23 25 24 24 23   Anion Gap 15 High   17 High   12  14 High   16 High   12    Glucose 168 High  161 High  165 High  252 High  389 High  229 High    Blood Urea Nitrogen (BUN) 18 25 High  13 18 19 13   Creatinine-Blood 1.27 High  1.28 High  1.00 1.04 High  0.98 0.91   BUN/Creatinine Ratio 14.3 19.3 13.0 17.2 19.6 13.7   Estimated GFR (Cr) 47 Low   47 Low   63  60 Low   64  70    Calcium 10.0 9.9 9.7 10.1 10.6 High  9.6                    Assessment and Plan        Diagnoses and all orders for this visit:    1. Chronic diastolic heart failure (Primary)  Assessment & Plan:  She is clinically not volume overloaded.  Pedal edema almost completely subsided.  Recent labs showed a creatinine of 1.2, which is above her baseline.  Sodium levels are stable.  We will decrease the dose of Bumex to 0.5 mg daily.  We will repeat BMP again in 2 weeks.    Orders:  -     Comprehensive Metabolic Panel; Future  -     bumetanide (BUMEX) 0.5 MG tablet; Take 1 tablet by mouth Daily for 360 days.  Dispense: 90 tablet; Refill: 1    2. Benign essential HTN  Assessment & Plan:  Well-controlled.  Continue metoprolol and losartan without changes.                Follow Up     Return in about 4 months (around 8/17/2024) for Next  scheduled follow up, with Carmen MCCLELLAN.    Patient was given instructions and counseling regarding her condition or for health maintenance advice. Please see specific information pulled into the AVS if appropriate.

## 2024-04-17 NOTE — ASSESSMENT & PLAN NOTE
She is clinically not volume overloaded.  Pedal edema almost completely subsided.  Recent labs showed a creatinine of 1.2, which is above her baseline.  Sodium levels are stable.  We will decrease the dose of Bumex to 0.5 mg daily.  We will repeat BMP again in 2 weeks.

## 2024-04-17 NOTE — LETTER
April 17, 2024     ELEUTERIO Ng  [address]  [place] [address]    Patient: Estelle Ruby   YOB: 1960   Date of Visit: 4/17/2024       Dear ELEUTERIO Ng    Estelle Ruby was in my office today. Below is a copy of my note.    If you have questions, please do not hesitate to call me. I look forward to following Estelle along with you.         Sincerely,        Bassem Rodriguez MD        CC: No Recipients      CARDIOLOGY FOLLOW-UP PROGRESS NOTE        Chief Complaint  Hypertension, Follow-up, and Congestive Heart Failure    Subjective           Estelle Ruby presents to [facility] CARDIOLOGY  History of Present Illness      Ms Ruby is here for a follow-up visit.  She was previously seen on 9/27/2023 when she came to establish cardiac care.  Bumex and metoprolol were continued.    Today, patient reports feeling fine.  She has mild shortness of breath which is stable.  Denies having any pedal edema.  Recent labs showed creatinine above baseline.      Past History:    Chronic diastolic heart failure.  COPD/asthma  Obstructive sleep apnea on CPAP  Mixed hyperlipidemia  Essential hypertension  Diabetes mellitus    Medical History:  Past Medical History:   Diagnosis Date   • Acid reflux    • Arthritis    • Asthma    • Diabetes mellitus    • Hypertension    • Mental disorder     Anxiety, Depression       Surgical History: has no past surgical history on file.     Family History: family history includes Cancer in her father; Stroke in her mother.     Social History: reports that she has quit smoking. Her smoking use included cigarettes. She has never used smokeless tobacco. She reports that she does not currently use drugs. She reports that she does not drink alcohol.    Allergies: Reclast [zoledronic acid] and Sulfa antibiotics    Current Outpatient Medications on File Prior to Visit   Medication Sig   • albuterol sulfate  (90 Base) MCG/ACT inhaler Inhale 2 puffs  Every 6 (Six) Hours As Needed.   • ascorbic acid (VITAMIN C) 1000 MG tablet Take 1 tablet by mouth Daily.   • budesonide-formoterol (SYMBICORT) 160-4.5 MCG/ACT inhaler Inhale 2 puffs.   • Calcium-Magnesium-Vitamin D - MG-MG-UNIT tablet sustained-release 24 hour Take  by mouth.   • Cariprazine HCl (VRAYLAR) 1.5 MG capsule capsule Take 1 capsule by mouth Daily.   • cholecalciferol (VITAMIN D3) 25 MCG (1000 UT) tablet Take 1 tablet by mouth Daily.   • clonazePAM (KlonoPIN) 0.5 MG tablet Take 1 tablet by mouth Daily As Needed.   • cyclobenzaprine (FLEXERIL) 10 MG tablet Take 1 tablet by mouth 3 (Three) Times a Day As Needed.   • diphenhydrAMINE (BENADRYL) 25 mg capsule Take 1 capsule by mouth Every 6 (Six) Hours As Needed.   • DULoxetine (CYMBALTA) 30 MG capsule Take 1 capsule by mouth Daily.   • FLUoxetine (PROzac) 20 MG capsule Take 1 capsule by mouth Every Morning.   • fluticasone (VERAMYST) 27.5 MCG/SPRAY nasal spray 2 sprays into the nostril(s) as directed by provider Daily.   • gabapentin (NEURONTIN) 300 MG capsule TAKE 1 CAPSULE BY MOUTH TWICE DAILY. MAX DAILY AMOUNT: 600 MG   • glipizide (GLUCOTROL) 10 MG tablet Take 1 tablet by mouth Daily As Needed.   • Lantus SoloStar 100 UNIT/ML injection pen Inject 26 Units under the skin into the appropriate area as directed Daily.   • losartan (COZAAR) 100 MG tablet Take 1 tablet by mouth Daily.   • lovastatin (MEVACOR) 20 MG tablet Take 1 tablet by mouth Daily.   • MAGNESIUM PO Take  by mouth.   • metoprolol succinate XL (TOPROL-XL) 50 MG 24 hr tablet Take 1 tablet by mouth Every Evening.   • Omega-3 Fatty Acids (fish oil) 1000 MG capsule capsule Take  by mouth.   • omeprazole (priLOSEC) 20 MG capsule Take 1 capsule by mouth Daily.   • Synjardy 12.5-1000 MG tablet Take 1 tablet by mouth 2 (Two) Times a Day Before Meals.   • traZODone (DESYREL) 100 MG tablet Take 75 mg by mouth every night at bedtime.   • vitamin B-12 (CYANOCOBALAMIN) 100 MCG tablet Take  by  "mouth.   • [DISCONTINUED] bumetanide (BUMEX) 1 MG tablet Take 1 tablet by mouth Daily for 360 days.       Review of Systems   Respiratory:  Positive for shortness of breath (Mild). Negative for cough and wheezing.    Cardiovascular:  Negative for chest pain, palpitations and leg swelling.   Gastrointestinal:  Negative for nausea and vomiting.   Neurological:  Negative for dizziness and syncope.        Objective    /68   Pulse 78   Ht 157.5 cm (62\")   Wt 98.4 kg (217 lb)   BMI 39.69 kg/m²       Physical Exam    General : Alert, awake, no acute distress  Neck : Supple, no carotid bruit, no jugular venous distention  CVS : Regular rate and rhythm, no murmur, rubs or gallops  Lungs: Clear to auscultation bilaterally, no crackles or rhonchi  Abdomen: Soft, nontender, bowel sounds heard in all 4 quadrants  Extremities: Warm, well-perfused, no pedal edema    Result Review:     The following data was reviewed by: Bassem Rodriguez MD on 04/17/2024:    Component 04/16/24 04/08/24 03/06/24 01/10/24 12/07/23 11/01/23   Sodium 133 Low  136 135 Low  129 Low  128 Low  128 Low    Potassium 4.8  4.2  5.0  4.7  5.2 High   4.7    Chloride 95 Low  96 Low  98 91 Low  88 Low  93 Low    Carbon Dioxide 23 23 25 24 24 23   Anion Gap 15 High   17 High   12  14 High   16 High   12    Glucose 168 High  161 High  165 High  252 High  389 High  229 High    Blood Urea Nitrogen (BUN) 18 25 High  13 18 19 13   Creatinine-Blood 1.27 High  1.28 High  1.00 1.04 High  0.98 0.91   BUN/Creatinine Ratio 14.3 19.3 13.0 17.2 19.6 13.7   Estimated GFR (Cr) 47 Low   47 Low   63  60 Low   64  70    Calcium 10.0 9.9 9.7 10.1 10.6 High  9.6                    Assessment and Plan        Diagnoses and all orders for this visit:    1. Chronic diastolic heart failure (Primary)  Assessment & Plan:  She is clinically not volume overloaded.  Pedal edema almost completely subsided.  Recent labs showed a creatinine of 1.2, which is above her baseline.  Sodium " levels are stable.  We will decrease the dose of Bumex to 0.5 mg daily.  We will repeat BMP again in 2 weeks.    Orders:  -     Comprehensive Metabolic Panel; Future  -     bumetanide (BUMEX) 0.5 MG tablet; Take 1 tablet by mouth Daily for 360 days.  Dispense: 90 tablet; Refill: 1    2. Benign essential HTN  Assessment & Plan:  Well-controlled.  Continue metoprolol and losartan without changes.                Follow Up     Return in about 4 months (around 8/17/2024) for Next scheduled follow up, with Carmen MCCLELLAN.    Patient was given instructions and counseling regarding her condition or for health maintenance advice. Please see specific information pulled into the AVS if appropriate.

## 2024-05-22 ENCOUNTER — TELEPHONE (OUTPATIENT)
Dept: SLEEP MEDICINE | Facility: HOSPITAL | Age: 64
End: 2024-05-22
Payer: MEDICARE

## 2024-09-11 DIAGNOSIS — I10 BENIGN ESSENTIAL HTN: ICD-10-CM

## 2024-09-11 RX ORDER — METOPROLOL SUCCINATE 50 MG/1
50 TABLET, EXTENDED RELEASE ORAL EVERY EVENING
Qty: 90 TABLET | Refills: 1 | Status: SHIPPED | OUTPATIENT
Start: 2024-09-11

## 2024-10-02 ENCOUNTER — LAB (OUTPATIENT)
Dept: LAB | Facility: HOSPITAL | Age: 64
End: 2024-10-02
Payer: MEDICARE

## 2024-10-02 ENCOUNTER — TRANSCRIBE ORDERS (OUTPATIENT)
Dept: ADMINISTRATIVE | Facility: HOSPITAL | Age: 64
End: 2024-10-02
Payer: MEDICARE

## 2024-10-02 DIAGNOSIS — E87.1 HYPONATREMIA: ICD-10-CM

## 2024-10-02 DIAGNOSIS — R81 GLUCOSE FOUND IN URINE ON EXAMINATION: ICD-10-CM

## 2024-10-02 DIAGNOSIS — E79.0 ELEVATED URIC ACID IN BLOOD: Primary | ICD-10-CM

## 2024-10-02 DIAGNOSIS — E79.0 ELEVATED URIC ACID IN BLOOD: ICD-10-CM

## 2024-10-02 LAB
BACTERIA UR QL AUTO: ABNORMAL /HPF
BILIRUB UR QL STRIP: NEGATIVE
CLARITY UR: CLEAR
COLOR UR: YELLOW
GLUCOSE UR STRIP-MCNC: ABNORMAL MG/DL
HGB UR QL STRIP.AUTO: NEGATIVE
KETONES UR QL STRIP: NEGATIVE
LEUKOCYTE ESTERASE UR QL STRIP.AUTO: NEGATIVE
NITRITE UR QL STRIP: NEGATIVE
PH UR STRIP.AUTO: 7 [PH] (ref 5–8)
PROT UR QL STRIP: NEGATIVE
RBC # UR STRIP: ABNORMAL /HPF
REF LAB TEST METHOD: ABNORMAL
SP GR UR STRIP: 1.01 (ref 1–1.03)
SQUAMOUS #/AREA URNS HPF: ABNORMAL /HPF
URATE SERPL-MCNC: 5.7 MG/DL (ref 2.4–5.7)
UROBILINOGEN UR QL STRIP: ABNORMAL
WBC # UR STRIP: ABNORMAL /HPF
YEAST URNS QL MICRO: ABNORMAL /HPF

## 2024-10-02 PROCEDURE — 87086 URINE CULTURE/COLONY COUNT: CPT

## 2024-10-02 PROCEDURE — 81001 URINALYSIS AUTO W/SCOPE: CPT

## 2024-10-02 PROCEDURE — 36415 COLL VENOUS BLD VENIPUNCTURE: CPT

## 2024-10-02 PROCEDURE — 80053 COMPREHEN METABOLIC PANEL: CPT

## 2024-10-02 PROCEDURE — 84550 ASSAY OF BLOOD/URIC ACID: CPT

## 2024-10-03 LAB
ALBUMIN SERPL-MCNC: 4.4 G/DL (ref 3.5–5.2)
ALBUMIN/GLOB SERPL: 1.3 G/DL
ALP SERPL-CCNC: 67 U/L (ref 39–117)
ALT SERPL W P-5'-P-CCNC: 22 U/L (ref 1–33)
ANION GAP SERPL CALCULATED.3IONS-SCNC: 13 MMOL/L (ref 5–15)
AST SERPL-CCNC: 21 U/L (ref 1–32)
BACTERIA SPEC AEROBE CULT: NORMAL
BILIRUB SERPL-MCNC: 0.3 MG/DL (ref 0–1.2)
BUN SERPL-MCNC: 16 MG/DL (ref 8–23)
BUN/CREAT SERPL: 16.2 (ref 7–25)
CALCIUM SPEC-SCNC: 9.6 MG/DL (ref 8.6–10.5)
CHLORIDE SERPL-SCNC: 97 MMOL/L (ref 98–107)
CO2 SERPL-SCNC: 24 MMOL/L (ref 22–29)
CREAT SERPL-MCNC: 0.99 MG/DL (ref 0.57–1)
EGFRCR SERPLBLD CKD-EPI 2021: 63.4 ML/MIN/1.73
GLOBULIN UR ELPH-MCNC: 3.4 GM/DL
GLUCOSE SERPL-MCNC: 164 MG/DL (ref 65–99)
POTASSIUM SERPL-SCNC: 4.6 MMOL/L (ref 3.5–5.2)
PROT SERPL-MCNC: 7.8 G/DL (ref 6–8.5)
SODIUM SERPL-SCNC: 134 MMOL/L (ref 136–145)

## 2024-10-08 ENCOUNTER — OFFICE VISIT (OUTPATIENT)
Dept: CARDIOLOGY | Facility: CLINIC | Age: 64
End: 2024-10-08
Payer: MEDICARE

## 2024-10-08 VITALS
WEIGHT: 209 LBS | DIASTOLIC BLOOD PRESSURE: 74 MMHG | HEIGHT: 63 IN | SYSTOLIC BLOOD PRESSURE: 120 MMHG | BODY MASS INDEX: 37.03 KG/M2 | HEART RATE: 72 BPM

## 2024-10-08 DIAGNOSIS — I50.32 CHRONIC DIASTOLIC HEART FAILURE: Primary | ICD-10-CM

## 2024-10-08 DIAGNOSIS — I10 BENIGN ESSENTIAL HTN: ICD-10-CM

## 2024-10-08 NOTE — PROGRESS NOTES
"Chief Complaint  Shortness of Breath and Congestive Heart Failure    Subjective        History of Present Illness  Estelle Ruby presents to [facility] CARDI[facility]   Ms. Ruby is a 64-year-old female coming in for cardiac follow-up for congestive heart failure.  At previous visit dose of diuretics was decreased.  She is doing well with this, and is not complaining of any further shortness of breath or pedal edema.          Past History:     Chronic diastolic heart failure.  COPD/asthma  Obstructive sleep apnea on CPAP  Mixed hyperlipidemia  Essential hypertension  Diabetes mellitus      Past Medical History:   Diagnosis Date    Acid reflux     Arthritis     Asthma     Diabetes mellitus     Hyperlipidemia     Hypertension     Mental disorder        Allergies   Allergen Reactions    Reclast [Zoledronic Acid] Other (See Comments)     Felt really sick , naseua      Sulfa Antibiotics Other (See Comments)     Pt reports \"really bad yeast infection\"        History reviewed. No pertinent surgical history.     Social History  She  reports that she has quit smoking. Her smoking use included cigarettes. She has never used smokeless tobacco. She reports that she does not currently use drugs. She reports that she does not drink alcohol.    Family History  Her family history includes Cancer in her father; Stroke in her mother.       Current Outpatient Medications on File Prior to Visit   Medication Sig    albuterol sulfate  (90 Base) MCG/ACT inhaler Inhale 2 puffs Every 6 (Six) Hours As Needed.    ascorbic acid (VITAMIN C) 1000 MG tablet Take 1 tablet by mouth Daily.    budesonide-formoterol (SYMBICORT) 160-4.5 MCG/ACT inhaler Inhale 2 puffs.    bumetanide (BUMEX) 0.5 MG tablet Take 1 tablet by mouth Daily for 360 days.    Calcium-Magnesium-Vitamin D - MG-MG-UNIT tablet sustained-release 24 hour Take  by mouth.    cholecalciferol (VITAMIN D3) 25 MCG (1000 UT) tablet Take 1 tablet by mouth Daily.    " clonazePAM (KlonoPIN) 0.5 MG tablet Take 1 tablet by mouth Daily As Needed.    cyclobenzaprine (FLEXERIL) 10 MG tablet Take 1 tablet by mouth 3 (Three) Times a Day As Needed.    diphenhydrAMINE (BENADRYL) 25 mg capsule Take 1 capsule by mouth Every 6 (Six) Hours As Needed.    DULoxetine (CYMBALTA) 30 MG capsule Take 1 capsule by mouth Daily.    FLUoxetine (PROzac) 20 MG capsule Take 1 capsule by mouth Every Morning.    fluticasone (VERAMYST) 27.5 MCG/SPRAY nasal spray Administer 2 sprays into the nostril(s) as directed by provider Daily.    gabapentin (NEURONTIN) 300 MG capsule TAKE 1 CAPSULE BY MOUTH TWICE DAILY. MAX DAILY AMOUNT: 600 MG    Lantus SoloStar 100 UNIT/ML injection pen Inject 26 Units under the skin into the appropriate area as directed Daily.    losartan (COZAAR) 100 MG tablet Take 1 tablet by mouth Daily.    MAGNESIUM PO Take  by mouth.    metoprolol succinate XL (TOPROL-XL) 50 MG 24 hr tablet TAKE 1 TABLET BY MOUTH ONCE DAILY IN THE EVENING    Omega-3 Fatty Acids (fish oil) 1000 MG capsule capsule Take  by mouth.    omeprazole (priLOSEC) 20 MG capsule Take 1 capsule by mouth Daily.    Synjardy 12.5-1000 MG tablet Take 1 tablet by mouth 2 (Two) Times a Day Before Meals.    traZODone (DESYREL) 100 MG tablet Take 75 mg by mouth every night at bedtime.    vitamin B-12 (CYANOCOBALAMIN) 100 MCG tablet Take  by mouth.    Cariprazine HCl (VRAYLAR) 1.5 MG capsule capsule Take 1 capsule by mouth Daily.    glipizide (GLUCOTROL) 10 MG tablet Take 1 tablet by mouth Daily As Needed.    lovastatin (MEVACOR) 20 MG tablet Take 1 tablet by mouth Daily.     No current facility-administered medications on file prior to visit.         Review of Systems   Constitutional:  Negative for fatigue.   Respiratory:  Negative for cough, chest tightness and shortness of breath.    Cardiovascular:  Negative for chest pain, palpitations and leg swelling.   Gastrointestinal:  Negative for nausea and vomiting.   Neurological:   "Negative for dizziness and syncope.        Objective   Vitals:    10/08/24 1102   BP: 120/74   Pulse: 72   Weight: 94.8 kg (209 lb)   Height: 160 cm (63\")         Physical Exam  General : Alert, awake, no acute distress  Neck : Supple, no carotid bruit, no jugular venous distention  CVS : Regular rate and rhythm, no murmur, no rubs or gallops  Lungs: Clear to auscultation bilaterally, no crackles or rhonchi  Abdomen: Soft, nontender, bowel sounds active  Extremities: Warm, well-perfused, no pedal edema      Result Review     The following data was reviewed by Carmen Negron, APRN  No results found for: \"PROBNP\"  CMP          10/2/2024    10:24   CMP   Glucose 164    BUN 16    Creatinine 0.99    EGFR 63.4    Sodium 134    Potassium 4.6    Chloride 97    Calcium 9.6    Total Protein 7.8    Albumin 4.4    Globulin 3.4    Total Bilirubin 0.3    Alkaline Phosphatase 67    AST (SGOT) 21    ALT (SGPT) 22    Albumin/Globulin Ratio 1.3    BUN/Creatinine Ratio 16.2    Anion Gap 13.0      CBC w/diff          3/6/2024    11:04 5/1/2024    10:58 9/25/2024    15:49   CBC w/Diff   WBC 9.11     8.20     10.56       RBC 4.32     4.43     4.79       Hemoglobin 11.7     11.9     12.6       Hematocrit 37.8     37.7     40.8       MCV 87.5     85.1     85.2       MCH 27.1     26.9     26.3       MCHC 31.0     31.6     30.9       RDW 14.6     14.7     16.8       Platelets 333     327     361       Neutrophil Rel % 61.0     65.5     66.5       Immature Granulocyte Rel % 1.0     1.1     1.1       Lymphocyte Rel % 25.1     22.0     20.8       Monocyte Rel % 8.8     7.6     7.2       Eosinophil Rel % 3.2     2.8     3.4       Basophil Rel % 0.9     1.0     1.0          Details          This result is from an external source.              Lab Results   Component Value Date    TSH 0.596 07/30/2023      Lab Results   Component Value Date    FREET4 1.40 07/30/2023      No results found for: \"DDIMERQUANT\"  Magnesium   Date Value Ref Range Status " "  08/02/2023 1.8 1.8 - 2.4 mg/dL Final      No results found for: \"DIGOXIN\"   No results found for: \"TROPONINT\"                       Assessment and Plan   Diagnoses and all orders for this visit:    1. Chronic diastolic heart failure (Primary)  Assessment & Plan:  Clinically she does not have any evidence of volume overload.  Renal function showed creatinine returned back to her baseline.  Continue current dose Bumex 0.5 daily      2. Benign essential HTN  Assessment & Plan:  Blood pressure is well-controlled, continue current medications.                  Follow Up   Return in about 6 months (around 4/8/2025) for with Dr. Rodriguez.    Patient was given instructions and counseling regarding her condition or for health maintenance advice. Please see specific information pulled into the AVS if appropriate.     Signed,  Carmen Negron, APRN  10/08/2024     Dictated Utilizing Dragon Dictation: Please note that portions of this note were completed with a voice recognition program.  Part of this note may be an electronic transcription/translation of spoken language to printed text using the Dragon Dictation System.    "

## 2024-10-08 NOTE — LETTER
"October 17, 2024     ELEUTERIO Ng  [address] [address]    Patient: Estelle Ruby   YOB: 1960   Date of Visit: 10/8/2024       Dear ELEUTERIO Ng    Estelle Ruby was in my office today. Below is a copy of my note.    If you have questions, please do not hesitate to call me. I look forward to following Estelle along with you.         Sincerely,        ELEUTERIO Ferguson        CC: No Recipients    Chief Complaint  Shortness of Breath and Congestive Heart Failure    Subjective       History of Present Illness  Estlele Ruby presents to [facility] CARDIOLOG[facility]   Ms. Ruby is a 64-year-old female coming in for cardiac follow-up for congestive heart failure.  At previous visit dose of diuretics was decreased.  She is doing well with this, and is not complaining of any further shortness of breath or pedal edema.          Past History:     Chronic diastolic heart failure.  COPD/asthma  Obstructive sleep apnea on CPAP  Mixed hyperlipidemia  Essential hypertension  Diabetes mellitus      Past Medical History:   Diagnosis Date   • Acid reflux    • Arthritis    • Asthma    • Diabetes mellitus    • Hyperlipidemia    • Hypertension    • Mental disorder        Allergies   Allergen Reactions   • Reclast [Zoledronic Acid] Other (See Comments)     Felt really sick , naseua     • Sulfa Antibiotics Other (See Comments)     Pt reports \"really bad yeast infection\"        History reviewed. No pertinent surgical history.     Social History  She  reports that she has quit smoking. Her smoking use included cigarettes. She has never used smokeless tobacco. She reports that she does not currently use drugs. She reports that she does not drink alcohol.    Family History  Her family history includes Cancer in her father; Stroke in her mother.       Current Outpatient Medications on File Prior to Visit   Medication Sig   • albuterol sulfate  (90 Base) MCG/ACT inhaler Inhale " 2 puffs Every 6 (Six) Hours As Needed.   • ascorbic acid (VITAMIN C) 1000 MG tablet Take 1 tablet by mouth Daily.   • budesonide-formoterol (SYMBICORT) 160-4.5 MCG/ACT inhaler Inhale 2 puffs.   • bumetanide (BUMEX) 0.5 MG tablet Take 1 tablet by mouth Daily for 360 days.   • Calcium-Magnesium-Vitamin D - MG-MG-UNIT tablet sustained-release 24 hour Take  by mouth.   • cholecalciferol (VITAMIN D3) 25 MCG (1000 UT) tablet Take 1 tablet by mouth Daily.   • clonazePAM (KlonoPIN) 0.5 MG tablet Take 1 tablet by mouth Daily As Needed.   • cyclobenzaprine (FLEXERIL) 10 MG tablet Take 1 tablet by mouth 3 (Three) Times a Day As Needed.   • diphenhydrAMINE (BENADRYL) 25 mg capsule Take 1 capsule by mouth Every 6 (Six) Hours As Needed.   • DULoxetine (CYMBALTA) 30 MG capsule Take 1 capsule by mouth Daily.   • FLUoxetine (PROzac) 20 MG capsule Take 1 capsule by mouth Every Morning.   • fluticasone (VERAMYST) 27.5 MCG/SPRAY nasal spray Administer 2 sprays into the nostril(s) as directed by provider Daily.   • gabapentin (NEURONTIN) 300 MG capsule TAKE 1 CAPSULE BY MOUTH TWICE DAILY. MAX DAILY AMOUNT: 600 MG   • Lantus SoloStar 100 UNIT/ML injection pen Inject 26 Units under the skin into the appropriate area as directed Daily.   • losartan (COZAAR) 100 MG tablet Take 1 tablet by mouth Daily.   • MAGNESIUM PO Take  by mouth.   • metoprolol succinate XL (TOPROL-XL) 50 MG 24 hr tablet TAKE 1 TABLET BY MOUTH ONCE DAILY IN THE EVENING   • Omega-3 Fatty Acids (fish oil) 1000 MG capsule capsule Take  by mouth.   • omeprazole (priLOSEC) 20 MG capsule Take 1 capsule by mouth Daily.   • Synjardy 12.5-1000 MG tablet Take 1 tablet by mouth 2 (Two) Times a Day Before Meals.   • traZODone (DESYREL) 100 MG tablet Take 75 mg by mouth every night at bedtime.   • vitamin B-12 (CYANOCOBALAMIN) 100 MCG tablet Take  by mouth.   • Cariprazine HCl (VRAYLAR) 1.5 MG capsule capsule Take 1 capsule by mouth Daily.   • glipizide (GLUCOTROL) 10 MG  "tablet Take 1 tablet by mouth Daily As Needed.   • lovastatin (MEVACOR) 20 MG tablet Take 1 tablet by mouth Daily.     No current facility-administered medications on file prior to visit.         Review of Systems   Constitutional:  Negative for fatigue.   Respiratory:  Negative for cough, chest tightness and shortness of breath.    Cardiovascular:  Negative for chest pain, palpitations and leg swelling.   Gastrointestinal:  Negative for nausea and vomiting.   Neurological:  Negative for dizziness and syncope.        Objective  Vitals:    10/08/24 1102   BP: 120/74   Pulse: 72   Weight: 94.8 kg (209 lb)   Height: 160 cm (63\")         Physical Exam  General : Alert, awake, no acute distress  Neck : Supple, no carotid bruit, no jugular venous distention  CVS : Regular rate and rhythm, no murmur, no rubs or gallops  Lungs: Clear to auscultation bilaterally, no crackles or rhonchi  Abdomen: Soft, nontender, bowel sounds active  Extremities: Warm, well-perfused, no pedal edema      Result Review    The following data was reviewed by Carmen Negron, APRN  No results found for: \"PROBNP\"  CMP          10/2/2024    10:24   CMP   Glucose 164    BUN 16    Creatinine 0.99    EGFR 63.4    Sodium 134    Potassium 4.6    Chloride 97    Calcium 9.6    Total Protein 7.8    Albumin 4.4    Globulin 3.4    Total Bilirubin 0.3    Alkaline Phosphatase 67    AST (SGOT) 21    ALT (SGPT) 22    Albumin/Globulin Ratio 1.3    BUN/Creatinine Ratio 16.2    Anion Gap 13.0      CBC w/diff          3/6/2024    11:04 5/1/2024    10:58 9/25/2024    15:49   CBC w/Diff   WBC 9.11     8.20     10.56       RBC 4.32     4.43     4.79       Hemoglobin 11.7     11.9     12.6       Hematocrit 37.8     37.7     40.8       MCV 87.5     85.1     85.2       MCH 27.1     26.9     26.3       MCHC 31.0     31.6     30.9       RDW 14.6     14.7     16.8       Platelets 333     327     361       Neutrophil Rel % 61.0     65.5     66.5       Immature Granulocyte Rel % " "1.0     1.1     1.1       Lymphocyte Rel % 25.1     22.0     20.8       Monocyte Rel % 8.8     7.6     7.2       Eosinophil Rel % 3.2     2.8     3.4       Basophil Rel % 0.9     1.0     1.0          Details          This result is from an external source.              Lab Results   Component Value Date    TSH 0.596 07/30/2023      Lab Results   Component Value Date    FREET4 1.40 07/30/2023      No results found for: \"DDIMERQUANT\"  Magnesium   Date Value Ref Range Status   08/02/2023 1.8 1.8 - 2.4 mg/dL Final      No results found for: \"DIGOXIN\"   No results found for: \"TROPONINT\"                       Assessment and Plan   Diagnoses and all orders for this visit:    1. Chronic diastolic heart failure (Primary)  Assessment & Plan:  Clinically she does not have any evidence of volume overload.  Renal function showed creatinine returned back to her baseline.  Continue current dose Bumex 0.5 daily      2. Benign essential HTN  Assessment & Plan:  Blood pressure is well-controlled, continue current medications.                  Follow Up   Return in about 6 months (around 4/8/2025) for with Dr. Rodriguez.    Patient was given instructions and counseling regarding her condition or for health maintenance advice. Please see specific information pulled into the AVS if appropriate.     Signed,  Carmen Negron, APRN  10/08/2024     Dictated Utilizing Dragon Dictation: Please note that portions of this note were completed with a voice recognition program.  Part of this note may be an electronic transcription/translation of spoken language to printed text using the Dragon Dictation System.  "

## 2024-10-18 NOTE — ASSESSMENT & PLAN NOTE
Clinically she does not have any evidence of volume overload.  Renal function showed creatinine returned back to her baseline.  Continue current dose Bumex 0.5 daily

## 2024-10-20 DIAGNOSIS — I50.32 CHRONIC DIASTOLIC HEART FAILURE: ICD-10-CM

## 2024-10-21 RX ORDER — BUMETANIDE 1 MG/1
1 TABLET ORAL DAILY
Qty: 90 TABLET | Refills: 0 | Status: SHIPPED | OUTPATIENT
Start: 2024-10-21

## 2024-10-22 ENCOUNTER — TRANSCRIBE ORDERS (OUTPATIENT)
Dept: ADMINISTRATIVE | Facility: HOSPITAL | Age: 64
End: 2024-10-22
Payer: MEDICARE

## 2024-10-22 DIAGNOSIS — R51.9 HEADACHE ABOVE THE EYE REGION: Primary | ICD-10-CM

## 2024-10-31 DIAGNOSIS — M25.50 PAIN IN JOINT, MULTIPLE SITES: ICD-10-CM

## 2024-10-31 DIAGNOSIS — M81.0 OSTEOPOROSIS, SENILE: Primary | ICD-10-CM

## 2024-11-11 ENCOUNTER — HOSPITAL ENCOUNTER (OUTPATIENT)
Dept: GENERAL RADIOLOGY | Facility: HOSPITAL | Age: 64
Discharge: HOME | End: 2024-11-11
Payer: MEDICARE

## 2024-11-11 ENCOUNTER — LAB (OUTPATIENT)
Dept: LAB | Facility: HOSPITAL | Age: 64
End: 2024-11-11
Payer: MEDICARE

## 2024-11-11 ENCOUNTER — HOSPITAL ENCOUNTER (OUTPATIENT)
Dept: MRI IMAGING | Facility: HOSPITAL | Age: 64
Discharge: HOME | End: 2024-11-11
Payer: MEDICARE

## 2024-11-11 DIAGNOSIS — M25.50 PAIN IN JOINT, MULTIPLE SITES: ICD-10-CM

## 2024-11-11 DIAGNOSIS — R51.9 HEADACHE ABOVE THE EYE REGION: ICD-10-CM

## 2024-11-11 DIAGNOSIS — I50.32 CHRONIC DIASTOLIC HEART FAILURE: ICD-10-CM

## 2024-11-11 LAB
ALBUMIN SERPL-MCNC: 4 G/DL (ref 3.5–5.2)
ALBUMIN/GLOB SERPL: 0.9 G/DL
ALP SERPL-CCNC: 68 U/L (ref 39–117)
ALT SERPL W P-5'-P-CCNC: 22 U/L (ref 1–33)
ANION GAP SERPL CALCULATED.3IONS-SCNC: 13.1 MMOL/L (ref 5–15)
AST SERPL-CCNC: 26 U/L (ref 1–32)
BILIRUB SERPL-MCNC: 0.2 MG/DL (ref 0–1.2)
BUN SERPL-MCNC: 23 MG/DL (ref 8–23)
BUN/CREAT SERPL: 15.5 (ref 7–25)
CALCIUM SPEC-SCNC: 9.5 MG/DL (ref 8.6–10.5)
CHLORIDE SERPL-SCNC: 98 MMOL/L (ref 98–107)
CO2 SERPL-SCNC: 22.9 MMOL/L (ref 22–29)
CREAT SERPL-MCNC: 1.48 MG/DL (ref 0.57–1)
CRP SERPL-MCNC: 0.46 MG/DL (ref 0–0.5)
EGFRCR SERPLBLD CKD-EPI 2021: 39.1 ML/MIN/1.73
GLOBULIN UR ELPH-MCNC: 4.5 GM/DL
GLUCOSE SERPL-MCNC: 190 MG/DL (ref 65–99)
POTASSIUM SERPL-SCNC: 4.9 MMOL/L (ref 3.5–5.2)
PROT SERPL-MCNC: 8.5 G/DL (ref 6–8.5)
SODIUM SERPL-SCNC: 134 MMOL/L (ref 136–145)

## 2024-11-11 PROCEDURE — 70551 MRI BRAIN STEM W/O DYE: CPT

## 2024-11-11 PROCEDURE — 36415 COLL VENOUS BLD VENIPUNCTURE: CPT

## 2024-11-11 PROCEDURE — 80053 COMPREHEN METABOLIC PANEL: CPT

## 2024-11-11 PROCEDURE — 72200 X-RAY EXAM SI JOINTS: CPT

## 2024-11-11 PROCEDURE — 73130 X-RAY EXAM OF HAND: CPT

## 2024-11-11 PROCEDURE — 86140 C-REACTIVE PROTEIN: CPT

## 2024-11-12 NOTE — PROGRESS NOTES
Labs done on 11/11/2024 reviewed.  Creatinine, marker of kidney function sees higher than previous levels.    Recommend to hold Bumex for next 3 days.  Can you also confirm the current dose of Bumex, which she is actually taking ?       Electronically signed by Bassem Rodriguez MD, 11/12/24, 5:59 PM EST.

## 2024-11-13 ENCOUNTER — TELEPHONE (OUTPATIENT)
Dept: CARDIOLOGY | Facility: CLINIC | Age: 64
End: 2024-11-13
Payer: MEDICARE

## 2024-11-13 DIAGNOSIS — I50.32 CHRONIC DIASTOLIC HEART FAILURE: Primary | ICD-10-CM

## 2024-11-13 RX ORDER — BUMETANIDE 0.5 MG/1
0.5 TABLET ORAL EVERY OTHER DAY
Start: 2024-11-13 | End: 2025-11-08

## 2024-11-13 NOTE — TELEPHONE ENCOUNTER
GRACE patient. Went over results and recommendations. Patient states she is taking a 1/2  of 1 mg Bumex tablet daily.   Patient verbalized understanding to hold for the next 3 days.

## 2024-11-13 NOTE — TELEPHONE ENCOUNTER
----- Message from Bassem Rodriguez sent at 11/12/2024  5:59 PM EST -----  Labs done on 11/11/2024 reviewed.  Creatinine, marker of kidney function sees higher than previous levels.    Recommend to hold Bumex for next 3 days.  Can you also confirm the current dose of Bumex, which she is actually taking ?       Electronically signed by Bassem Rodriguez MD, 11/12/24, 5:59 PM EST.

## 2024-11-13 NOTE — TELEPHONE ENCOUNTER
GRACE patient. Went over medication changes, and lab orders. Patient verbalized understanding and appreciation.     Medication changes have been updated, lab orders have been placed.

## 2024-11-13 NOTE — TELEPHONE ENCOUNTER
Recommend to restart Bumex half tablet every other day after 3 days.  Repeat BMP in 2 weeks  Please call us back for any increase in swelling after decreasing the Bumex.      Electronically signed by Bassem Rodriguez MD, 11/13/24, 9:16 AM EST.

## 2024-11-15 ENCOUNTER — HOSPITAL ENCOUNTER (OUTPATIENT)
Dept: BONE DENSITY | Facility: HOSPITAL | Age: 64
Discharge: HOME | End: 2024-11-15
Payer: MEDICARE

## 2024-11-15 DIAGNOSIS — M81.0 OSTEOPOROSIS, SENILE: ICD-10-CM

## 2024-11-15 PROCEDURE — 77080 DXA BONE DENSITY AXIAL: CPT

## 2024-11-18 LAB — HLA-B27 QL NAA+PROBE: NEGATIVE

## 2024-11-20 ENCOUNTER — TELEPHONE (OUTPATIENT)
Dept: SLEEP MEDICINE | Facility: HOSPITAL | Age: 64
End: 2024-11-20
Payer: MEDICARE

## 2024-11-20 ENCOUNTER — OFFICE VISIT (OUTPATIENT)
Dept: SLEEP MEDICINE | Facility: HOSPITAL | Age: 64
End: 2024-11-20
Payer: MEDICARE

## 2024-11-20 VITALS
HEART RATE: 65 BPM | OXYGEN SATURATION: 100 % | WEIGHT: 207 LBS | DIASTOLIC BLOOD PRESSURE: 69 MMHG | BODY MASS INDEX: 38.09 KG/M2 | HEIGHT: 62 IN | SYSTOLIC BLOOD PRESSURE: 123 MMHG

## 2024-11-20 DIAGNOSIS — G47.33 OSA ON CPAP: Primary | ICD-10-CM

## 2024-11-20 DIAGNOSIS — E66.9 OBESITY (BMI 30-39.9): ICD-10-CM

## 2024-11-20 DIAGNOSIS — G47.34 SLEEP RELATED HYPOXIA: ICD-10-CM

## 2024-11-20 DIAGNOSIS — Z79.4 TYPE 2 DIABETES MELLITUS WITH HYPOGLYCEMIA WITHOUT COMA, WITH LONG-TERM CURRENT USE OF INSULIN: ICD-10-CM

## 2024-11-20 DIAGNOSIS — I50.32 CHRONIC DIASTOLIC HEART FAILURE: ICD-10-CM

## 2024-11-20 DIAGNOSIS — E11.649 TYPE 2 DIABETES MELLITUS WITH HYPOGLYCEMIA WITHOUT COMA, WITH LONG-TERM CURRENT USE OF INSULIN: ICD-10-CM

## 2024-11-20 DIAGNOSIS — I10 BENIGN ESSENTIAL HTN: ICD-10-CM

## 2024-11-20 PROBLEM — E66.01 OBESITY, MORBID, BMI 40.0-49.9: Status: RESOLVED | Noted: 2023-04-18 | Resolved: 2024-11-20

## 2024-11-20 PROCEDURE — 3078F DIAST BP <80 MM HG: CPT | Performed by: INTERNAL MEDICINE

## 2024-11-20 PROCEDURE — 3074F SYST BP LT 130 MM HG: CPT | Performed by: INTERNAL MEDICINE

## 2024-11-20 PROCEDURE — G0463 HOSPITAL OUTPT CLINIC VISIT: HCPCS

## 2024-11-20 RX ORDER — INSULIN GLARGINE AND LIXISENATIDE 100; 33 U/ML; UG/ML
INJECTION, SOLUTION SUBCUTANEOUS
COMMUNITY

## 2024-11-20 RX ORDER — TOPIRAMATE 25 MG/1
50 TABLET, FILM COATED ORAL DAILY
COMMUNITY
Start: 2024-10-18

## 2024-11-20 RX ORDER — FLUOXETINE 20 MG/1
20 TABLET ORAL DAILY
COMMUNITY

## 2024-11-20 RX ORDER — LORATADINE 10 MG/1
10 TABLET ORAL
COMMUNITY

## 2024-11-20 NOTE — PROGRESS NOTES
Sleep Disorder Follow Up    Patient Name: Estelle Ruby  Age/Sex: 64 y.o. female  : 1960  MRN: [id number]    Date of Encounter Visit: 24   Referring Provider: Gael Villa MD  Place of Service: [facility]  Patient Care Team:  Elizabeth Soriano APRN as PCP - General (Nurse Practitioner)    PROBLEM LIST:  Obstructive sleep apnea  Sleep-related hypoxemia  Obesity, with downgrade from morbid to regular obesity BMI is now less than 40  Uncomplicated asthma  Type 2 diabetes mellitus  Essential hypertension      History of Present Illness:  Estelle Ruby is a 64 y.o. female who is here for follow up on KRISTOFER. Patient was last seen in the office on 2023.  On the last visit, the patient already have the diagnosis and the sleep study available and were reviewed and she needed to have new CPAP supplies which were ordered and patient is here for her yearly follow-up  Patient uses machine every night with no complaints from the mask or the pressure.  Patient uses a nasal mask, which does  fit well. Patient has moderate air leak.  Positive dry mouth.  DME is [employer]  Patient sleeps better and has a deeper sleep with the machine and feels more energy during the day time.  Her machine is more than 5 years old, it is starting to malfunction, she is already getting the display message on her machine that it needs to be replaced   The air leak does not bother her but she is interested in trying a new mask that may be a better fit   Currently on auto CPAP 5-15 cm H20.  [place] Sleepiness Scale (ESS) is 8.  Compliance download was reviewed and documented below.  Weight is down by 19 pounds since last visit. She is on   Other comorbidities include  Hypertension, anxiety and depression, insomnia, diabetes mellitus, asthma/COPD, acid reflux, arthritis     Review of Systems:   A ten-system review was conducted and was negative except for the following: Nasal congestion, dry mouth/nose, shortness of  breath, wheezing and cough, anxiety and depression.        Past Medical History:  Past medical, surgical, social, and family history, except as mentioned above, was unchanged from the last visit.     Past Medical History:   Diagnosis Date    Acid reflux     Arthritis     Asthma     Diabetes mellitus     Hyperlipidemia     Hypertension     Mental disorder     Anxiety, Depression       History reviewed. No pertinent surgical history.    Home Medications:     Current Outpatient Medications:     albuterol sulfate  (90 Base) MCG/ACT inhaler, Inhale 2 puffs Every 6 (Six) Hours As Needed., Disp: , Rfl:     ascorbic acid (VITAMIN C) 1000 MG tablet, Take 1 tablet by mouth Daily., Disp: , Rfl:     budesonide-formoterol (SYMBICORT) 160-4.5 MCG/ACT inhaler, Inhale 2 puffs., Disp: , Rfl:     bumetanide (BUMEX) 0.5 MG tablet, Take 1 tablet by mouth Every Other Day for 360 days., Disp: , Rfl:     Calcium-Magnesium-Vitamin D - MG-MG-UNIT tablet sustained-release 24 hour, Take  by mouth., Disp: , Rfl:     cholecalciferol (VITAMIN D3) 25 MCG (1000 UT) tablet, Take 1 tablet by mouth Daily., Disp: , Rfl:     clonazePAM (KlonoPIN) 0.5 MG tablet, Take 1 tablet by mouth Daily As Needed., Disp: , Rfl:     cyclobenzaprine (FLEXERIL) 10 MG tablet, Take 1 tablet by mouth 3 (Three) Times a Day As Needed., Disp: , Rfl:     diphenhydrAMINE (BENADRYL) 25 mg capsule, Take 1 capsule by mouth Every 6 (Six) Hours As Needed., Disp: , Rfl:     DULoxetine (CYMBALTA) 30 MG capsule, Take 1 capsule by mouth Daily., Disp: , Rfl:     FLUoxetine (PROzac) 20 MG capsule, Take 1 capsule by mouth Every Morning., Disp: , Rfl:     fluticasone (VERAMYST) 27.5 MCG/SPRAY nasal spray, Administer 2 sprays into the nostril(s) as directed by provider Daily., Disp: , Rfl:     gabapentin (NEURONTIN) 300 MG capsule, TAKE 1 CAPSULE BY MOUTH TWICE DAILY. MAX DAILY AMOUNT: 600 MG, Disp: , Rfl:     loratadine (CLARITIN) 10 MG tablet, Take 1 tablet by mouth.,  "Disp: , Rfl:     losartan (COZAAR) 100 MG tablet, Take 1 tablet by mouth Daily., Disp: , Rfl:     metoprolol succinate XL (TOPROL-XL) 50 MG 24 hr tablet, TAKE 1 TABLET BY MOUTH ONCE DAILY IN THE EVENING, Disp: 90 tablet, Rfl: 1    Omega-3 Fatty Acids (fish oil) 1000 MG capsule capsule, Take  by mouth., Disp: , Rfl:     omeprazole (priLOSEC) 20 MG capsule, Take 1 capsule by mouth Daily., Disp: , Rfl:     Soliqua 100-33 UNT-MCG/ML solution pen-injector injection, INJECT 30 UNITS SUBCUTANEOUSLY ONCE DAILY WITH BREAKFAST (30 MINUTES PRIOR TO FIRST MEAL OF THE DAY.), Disp: , Rfl:     topiramate (TOPAMAX) 25 MG tablet, Take 2 tablets by mouth Daily., Disp: , Rfl:     traZODone (DESYREL) 100 MG tablet, Take 75 mg by mouth every night at bedtime., Disp: , Rfl:     vitamin B-12 (CYANOCOBALAMIN) 100 MCG tablet, Take  by mouth., Disp: , Rfl:     Cariprazine HCl (VRAYLAR) 1.5 MG capsule capsule, Take 1 capsule by mouth Daily., Disp: , Rfl:     FLUoxetine HCl, PMDD, 20 MG tablet, Take 20 mg by mouth Daily. (Patient not taking: Reported on 11/20/2024), Disp: , Rfl:     glipizide (GLUCOTROL) 10 MG tablet, Take 1 tablet by mouth Daily As Needed., Disp: , Rfl:     Lantus SoloStar 100 UNIT/ML injection pen, Inject 26 Units under the skin into the appropriate area as directed Daily., Disp: , Rfl:     lovastatin (MEVACOR) 20 MG tablet, Take 1 tablet by mouth Daily., Disp: 30 tablet, Rfl: 11    MAGNESIUM PO, Take  by mouth., Disp: , Rfl:     Synjardy 12.5-1000 MG tablet, Take 1 tablet by mouth 2 (Two) Times a Day Before Meals. (Patient not taking: Reported on 11/20/2024), Disp: , Rfl:     Allergies:  Allergies   Allergen Reactions    Reclast [Zoledronic Acid] Other (See Comments)     Felt really sick , naseua      Sulfa Antibiotics Other (See Comments)     Pt reports \"really bad yeast infection\"       Past Social History:  Social History     Socioeconomic History    Marital status: Single   Tobacco Use    Smoking status: Former     " "Current packs/day: 1.00     Types: Cigarettes    Smokeless tobacco: Never   Vaping Use    Vaping status: Every Day    Substances: Nicotine, Flavoring    Devices: Refillable tank   Substance and Sexual Activity    Alcohol use: Never    Drug use: Not Currently    Sexual activity: Defer       Past Family History:  Family History   Problem Relation Age of Onset    Stroke Mother     Cancer Father          Objective:        Vital Signs:   Visit Vitals  /69 (BP Location: Right arm, Patient Position: Sitting)   Pulse 65   Ht 157.5 cm (62.01\")   Wt 93.9 kg (207 lb)   SpO2 100%   BMI 37.85 kg/m²     Wt Readings from Last 3 Encounters:   11/20/24 93.9 kg (207 lb)   11/11/24 94.8 kg (209 lb)   10/08/24 94.8 kg (209 lb)          Physical Exam:   GEN:  No acute distress, alert, cooperative, well developed   EYES:   Sclerae clear. No icterus. PERRL. Normal EOM  ENT:   External ears/nose normal, no oral lesions, no thrush, mucous membranes moist, Septum midline. Mallampati IV airway. Large tongue, large uvula , short obese neck   NECK:  Supple, midline trachea, no JVD  LUNGS: Normal chest on inspection, CTAB, no wheezes. No rhonchi. No crackles. Respirations regular, even and unlabored.   CV:  Regular rhythm and rate. Normal S1/S2. No murmurs, gallops, or rubs noted.  ABD:  Soft, nontender and nondistended. Normal bowel sounds. No guarding  EXT:  Moves all extremities well. No cyanosis. No redness. Trace  edema.   Skin: Dry, intact, no bleeding      Diagnostic Data:  HST 9/28/2017 : AHI 40.2, 25% recording time below 90% saturation   Compliance download 5-15 , average nightly use 9.27 hours, residual AHI 0.5, average air leak 32 lpm     Compliance download from 8/22/2024 - 11/19/2024 showed 100% adherence  Average nightly use is excellent with 9 hours and 38 minutes per night  On auto CPAP 5-15 with a median/95th percentile pressure of 5.9/7.8 cm H2O  Airleak is moderate to large with a median/95th percentile of 25.3/50.3 " L/min  Sleep apnea is very well-controlled with residual AHI of 0.7      Assessment and Plan:       ICD-10-CM ICD-9-CM   1. KRISTOFER on CPAP  G47.33 327.23   2. Sleep related hypoxia  G47.34 327.24   3. Obesity (BMI 30-39.9)  E66.9 278.00   4. Type 2 diabetes mellitus with hypoglycemia without coma, with long-term current use of insulin  E11.649 250.80    Z79.4 251.2     V58.67   5. Chronic diastolic heart failure  I50.32 428.32   6. Benign essential HTN  I10 401.1       Recommendations:     Patient is compliant with the CPAP as evident from the compliance download  Patient is getting both clinical and subjective benefit from the use of the CPAP machine  The CPAP machine is effective in controlling the underlying sleep apnea  CPAP is strongly recommended to be continued  Patient to continue work on the weight loss  No pressure adjustment recommended  She needs a new machine, will order one with the same setting and at the same time will try to do a new mask fitting and will follow up in 2 months and consider further adjustments if needed   She did great job with the weight loss and encouraged to continue with that     Orders Placed This Encounter   Procedures    PAP Therapy     No orders of the defined types were placed in this encounter.    Return in about 2 months (around 1/20/2025).    Gael Villa MD   [facility]   11/20/24  10:58 EST    Dictated utilizing Dragon dictation

## 2024-12-02 ENCOUNTER — LAB (OUTPATIENT)
Dept: LAB | Facility: HOSPITAL | Age: 64
End: 2024-12-02
Payer: MEDICARE

## 2024-12-02 DIAGNOSIS — I50.32 CHRONIC DIASTOLIC HEART FAILURE: ICD-10-CM

## 2024-12-02 LAB
ANION GAP SERPL CALCULATED.3IONS-SCNC: 11.8 MMOL/L (ref 5–15)
BUN SERPL-MCNC: 19 MG/DL (ref 8–23)
BUN/CREAT SERPL: 15.3 (ref 7–25)
CALCIUM SPEC-SCNC: 9.4 MG/DL (ref 8.6–10.5)
CHLORIDE SERPL-SCNC: 96 MMOL/L (ref 98–107)
CO2 SERPL-SCNC: 23.2 MMOL/L (ref 22–29)
CREAT SERPL-MCNC: 1.24 MG/DL (ref 0.57–1)
EGFRCR SERPLBLD CKD-EPI 2021: 48.4 ML/MIN/1.73
GLUCOSE SERPL-MCNC: 173 MG/DL (ref 65–99)
POTASSIUM SERPL-SCNC: 5.1 MMOL/L (ref 3.5–5.2)
SODIUM SERPL-SCNC: 131 MMOL/L (ref 136–145)

## 2024-12-02 PROCEDURE — 80048 BASIC METABOLIC PNL TOTAL CA: CPT

## 2024-12-02 PROCEDURE — 36415 COLL VENOUS BLD VENIPUNCTURE: CPT

## 2024-12-05 ENCOUNTER — TELEPHONE (OUTPATIENT)
Dept: CARDIOLOGY | Facility: CLINIC | Age: 64
End: 2024-12-05
Payer: MEDICARE

## 2024-12-05 NOTE — TELEPHONE ENCOUNTER
[facility] received a fax that requires your attention. The document has been indexed to the patient’s chart for your review.      Reason for sending: EXTERNAL MEDICAL RECORD NOTIFICATION     Documents Description: CARDIAC CLEARANCE REQ-[facility]-12.3.24    Name of Sender: [facility]     Date Indexed: 12.3.24

## 2024-12-05 NOTE — TELEPHONE ENCOUNTER
She may proceed with upcoming surgical procedure at moderate risk for perioperative cardiac events.

## 2024-12-05 NOTE — TELEPHONE ENCOUNTER
Procedure: Right Reverse Total Shoulder Replacement    Medication Directive: NA    PMH: CHF, HTN    Last Seen: 10/08/2024

## 2025-01-19 DIAGNOSIS — I50.32 CHRONIC DIASTOLIC HEART FAILURE: ICD-10-CM

## 2025-01-20 RX ORDER — BUMETANIDE 1 MG/1
1 TABLET ORAL DAILY
Qty: 90 TABLET | Refills: 0 | OUTPATIENT
Start: 2025-01-20

## 2025-02-10 ENCOUNTER — TREATMENT (OUTPATIENT)
Dept: PHYSICAL THERAPY | Facility: CLINIC | Age: 65
End: 2025-02-10
Payer: MEDICARE

## 2025-02-10 DIAGNOSIS — M25.611 DECREASED RIGHT SHOULDER RANGE OF MOTION: ICD-10-CM

## 2025-02-10 DIAGNOSIS — M25.511 ACUTE PAIN OF RIGHT SHOULDER: ICD-10-CM

## 2025-02-10 DIAGNOSIS — Z96.611 S/P REVERSE TOTAL SHOULDER ARTHROPLASTY, RIGHT: Primary | ICD-10-CM

## 2025-02-10 DIAGNOSIS — R29.898 WEAKNESS OF RIGHT SHOULDER: ICD-10-CM

## 2025-02-10 PROCEDURE — 97161 PT EVAL LOW COMPLEX 20 MIN: CPT | Performed by: PHYSICAL THERAPIST

## 2025-02-10 PROCEDURE — 97140 MANUAL THERAPY 1/> REGIONS: CPT | Performed by: PHYSICAL THERAPIST

## 2025-02-10 NOTE — PROGRESS NOTES
Physical Therapy Initial Evaluation and Plan of Care  [facility], [address], [place] [address]      Patient: Estelle Ruby   : 1960  Diagnosis/ICD-10 Code:  S/P reverse total shoulder arthroplasty, right [Z96.611]  Referring practitioner: LEANNE Hightower*  Date of Initial Visit: 2/10/2025  Today's Date: 2/10/2025  Patient seen for 1 sessions         Visit Diagnoses:    ICD-10-CM ICD-9-CM   1. S/P reverse total shoulder arthroplasty, right  Z96.611 V43.61   2. Acute pain of right shoulder  M25.511 719.41   3. Weakness of right shoulder  R29.898 781.99   4. Decreased right shoulder range of motion  M25.611 719.51         Subjective Evaluation    History of Present Illness  Mechanism of injury: Estelle comes to [facility] s/p 24 reverse R TSA, same day surgery discharge.  She has not had any home health physical therapy.    She is having more pain in the R shoulder blade rather than the actual shoulder joint itself, more pinching feeling.     The R hand has been going numb.  She takes it out of the sling and lets it hang; the feeling will come back.   R handed    She goes back to ortho 3/3/25.     She feels like she is doing okay with the exception of sleeping.  She isn't sleeping long periods of time.  She has been having cramps in her legs lately or her R arm is hurting.     Pain  Current pain ratin  Quality: dull ache and radiating (pinching in scapula)  Aggravating factors: overhead activity, lifting, prolonged positioning, sleeping, outstretched reach, repetitive movement and movement    Social Support  Lives in: one-story house  Lives with: alone    Hand dominance: right    Patient Goals  Patient goals for therapy: decreased edema, decreased pain, increased motion, increased strength, independence with ADLs/IADLs and return to sport/leisure activities             Objective           General Comments     Shoulder Comments   Posture: R shoulder in the sling    R shoulder PROM //  AROM:  Flexion: 120 degrees // not tested, degrees  External rotation: 40 degrees // not tested, degrees    R shoulder MMT  Flexion: not tested/5  Extension: not tested/5  Abduction: not tested/5  External rotation: not tested/5  Internal rotation: not tested/5    Radicular symptoms:  some in the hand when the arm is in the sling too long, will go away once she extends the arm    Tenderness: R scapula, deltoid, upper trap    Incision: healing nicely           Assessment & Plan       Assessment  Impairments: abnormal muscle firing, abnormal or restricted ROM, activity intolerance, impaired physical strength, lacks appropriate home exercise program, pain with function and safety issue   Functional limitations: carrying objects, lifting, sleeping, pulling, pushing, uncomfortable because of pain, reaching behind back, reaching overhead and unable to perform repetitive tasks   Assessment details: Pt presents with limitations, noted below, that impede her ability to reach overhead, reach outside LALA, sleeping, hobbies, lifting. The skills of a therapist will be required to safely and effectively implement the following treatment plan to restore maximal level of function.        Goals  Plan Goals: SHOULDER  PROBLEMS:     1. The patient has limited ROM of the right shoulder.    LTG 1: 12 weeks:  The patient will demonstrate 160 degrees of right shoulder flexion, 160 degrees of shoulder abduction, 70 degrees of shoulder external rotation, and 70 degrees of shoulder internal rotation to allow the patient to reach into upper kitchen cabinets and manipulate clothing behind the back with greater ease.    STATUS:  New   STG 1a: 4 weeks:  The patient will demonstrate 120 degrees of right shoulder flexion, 120 degrees of shoulder abduction, 45 degrees of shoulder external rotation, and 45 degrees of shoulder internal rotation.    STATUS:  New   TREATMENT: Manual therapy, therapeutic exercise, home exercise instruction, and  modalities as needed to include: electrical stimulation, ultrasound, moist heat, and ice.    2. The patient has limited strength of the right shoulder.   LTG 2: 12 weeks:  The patient will demonstrate 4+ /5 strength for right shoulder flexion, abduction, external rotation, and internal rotation in order to demonstrate improved shoulder stability.    STATUS:  New   STG 2a: 4 weeks:  The patient will demonstrate 4- /5 strength for right shoulder flexion, abduction, external rotation, and internal rotation.    STATUS:  New   STG2b:  4 weeks:  The patient will be independent with home exercises.     STATUS:  New   TREATMENT: Manual therapy, therapeutic exercise, home exercise instruction, and modalities as needed to include: electrical stimulation, ultrasound, moist heat, and ice.     3. The patient complains of pain to the right shoulder.   LTG 3: 12 weeks:  The patient will report a pain rating of 1 /10 or better in order to improve sleep quality and tolerance to performance of activities of daily living.    STATUS:  New   STG 3a: 4 weeks:  The patient will report a pain rating of 2 /10 or better.     STATUS:  New   TREATMENT: Manual therapy, therapeutic exercise, home exercise instruction, and modalities as needed to include: electrical stimulation, ultrasound, moist heat, and ice.    4. Carrying, Moving, and Handling Objects Functional Limitation     LTG 4: 12 weeks:  The patient will demonstrate 40 % limitation by achieving a score on the QuickDASH.    STATUS:  New   STG 4a: 4 weeks:  The patient will demonstrate 60 % limitation by achieving a score on the QuickDASH.      STATUS:  New   TREATMENT:  Manual therapy, therapeutic exercise, home exercise instruction, and modalities as needed to include: moist heat, electrical stimulation, and ultrasound.      Plan  Therapy options: will be seen for skilled therapy services  Planned modality interventions: cryotherapy, dry needling, electrical stimulation/Russian  stimulation, TENS, thermotherapy (hydrocollator packs) and ultrasound  Planned therapy interventions: manual therapy, flexibility, functional ROM exercises, home exercise program, therapeutic activities, stretching, strengthening, soft tissue mobilization, postural training, neuromuscular re-education, body mechanics training, joint mobilization, ADL retraining, fine motor coordination training, abdominal trunk stabilization and balance/weight-bearing training  Frequency: 2x week  Duration in weeks: 12  Treatment plan discussed with: patient  Plan details: Review HEP, update as needed.    Progress with R shoulder ROM, decrease pain levels, improve strength, improve scapular stabilization, postural awareness, increased stamina, decreased tightness, improved ROM/flexibility, improve fine motor and coordination, education as needed.            History # of Personal Factors and/or Comorbidities: MODERATE (1-2)  Examination of Body System(s): # of elements: MODERATE (3)  Clinical Presentation: STABLE   Clinical Decision Making: LOW     Timed:  Manual Therapy:    8     mins  40759;  Therapeutic Exercise:    4     mins  42130;     Neuromuscular Cassidy:       mins  10877;    Therapeutic Activity:          mins  06361;     Gait Training:           mins  02411;     Ultrasound:                          mins  84448;  Self Care:           mins  40318          Un-timed:  Electrical Stimulation:         mins  04382 ( );  Dry Needling          mins self-pay;  Mechanical Traction           mins  59740;  Low Eval     22     mins 98206;  Moderate Eval          mins 47702;  High Eval          mins 38577;  Re-Eval          mins 85710;  Canalith Repos         mins 64391      Timed Treatment:   12   mins   Total Treatment:     34   mins        PT SIGNATURE: Elizabeth Rodriguez PT, DPT  [place] License: [id number]  Electronically signed by Elizabeth Rodriguez PT, 02/10/25, 12:50 PM EST      Initial Certification    Certification Period: 2/10/2025 thru  5/10/2025  I certify that the therapy services are furnished while this patient is under my care.  The services outlined above are required by this patient, and will be reviewed every 90 days.     PHYSICIAN: Kuldeep Cason PA-C  NPI: [id number]            PHYSICIAN PRINT NAME: ______________________________________________      PHYSICIAN SIGNATURE: ______________________________________________         DATE:________________________________        Please sign and return via fax to [phone].  Thank you, [facility].

## 2025-02-13 ENCOUNTER — TREATMENT (OUTPATIENT)
Dept: PHYSICAL THERAPY | Facility: CLINIC | Age: 65
End: 2025-02-13
Payer: MEDICARE

## 2025-02-13 DIAGNOSIS — R29.898 WEAKNESS OF RIGHT SHOULDER: ICD-10-CM

## 2025-02-13 DIAGNOSIS — Z96.611 S/P REVERSE TOTAL SHOULDER ARTHROPLASTY, RIGHT: Primary | ICD-10-CM

## 2025-02-13 DIAGNOSIS — M25.511 ACUTE PAIN OF RIGHT SHOULDER: ICD-10-CM

## 2025-02-13 DIAGNOSIS — M25.611 DECREASED RIGHT SHOULDER RANGE OF MOTION: ICD-10-CM

## 2025-02-13 PROCEDURE — 97110 THERAPEUTIC EXERCISES: CPT | Performed by: PHYSICAL THERAPIST

## 2025-02-13 PROCEDURE — 97140 MANUAL THERAPY 1/> REGIONS: CPT | Performed by: PHYSICAL THERAPIST

## 2025-02-13 NOTE — PROGRESS NOTES
Physical Therapy Daily Treatment Note  [facility], [address], [place] [address]      Patient: Estelle Ruby   : 1960  Referring practitioner: LEANNE Hightower*  Date of Initial Visit: Type: THERAPY  Noted: 2/10/2025  Today's Date: 2025  Patient seen for 2 sessions           Subjective Evaluation    History of Present Illness    Subjective comment: 3/10 in the [facility]       Objective   See Exercise, Manual, and Modality Logs for complete treatment.       Assessment & Plan       Assessment  Assessment details: Pt pain was low at this visit and pt was wearing her sling to therapy. There is some tenderness with PROM but it was very tolerable to the pt.           Visit Diagnoses:    ICD-10-CM ICD-9-CM   1. S/P reverse total shoulder arthroplasty, right  Z96.611 V43.61   2. Acute pain of right shoulder  M25.511 719.41   3. Weakness of right shoulder  R29.898 781.99   4. Decreased right shoulder range of motion  M25.611 719.51       Progress per Plan of Care    Timed:    Therapeutic Exercise:    12     mins  03077;  Manual Therapy:    14     mins  45953;     Neuromuscular Cassidy:       mins  49931;    Therapeutic Activity:          mins  07278;     Gait Training:           mins  79875;     Ultrasound:          mins  06515;      Untimed:  Electrical Stimulation:         mins  36600 ( );  Mechanical Traction:         mins  53953;   Group           mins  61508    Timed Treatment:   26   mins   Total Treatment:     28   mins      Leeanna Hnana PTA  Physical Therapist Assistant

## 2025-02-17 ENCOUNTER — TREATMENT (OUTPATIENT)
Dept: PHYSICAL THERAPY | Facility: CLINIC | Age: 65
End: 2025-02-17
Payer: MEDICARE

## 2025-02-17 DIAGNOSIS — Z96.611 S/P REVERSE TOTAL SHOULDER ARTHROPLASTY, RIGHT: Primary | ICD-10-CM

## 2025-02-17 DIAGNOSIS — R29.898 WEAKNESS OF RIGHT SHOULDER: ICD-10-CM

## 2025-02-17 DIAGNOSIS — M25.511 ACUTE PAIN OF RIGHT SHOULDER: ICD-10-CM

## 2025-02-17 DIAGNOSIS — M25.611 DECREASED RIGHT SHOULDER RANGE OF MOTION: ICD-10-CM

## 2025-02-17 PROCEDURE — 97140 MANUAL THERAPY 1/> REGIONS: CPT | Performed by: PHYSICAL THERAPIST

## 2025-02-17 PROCEDURE — 97530 THERAPEUTIC ACTIVITIES: CPT | Performed by: PHYSICAL THERAPIST

## 2025-02-17 NOTE — PROGRESS NOTES
"Outpatient Physical Therapy                   Physical Therapy Daily Treatment Note    Patient: Estelle Ruby   : 1960  Diagnosis/ICD-10 Code:  S/P reverse total shoulder arthroplasty, right [Z96.611]  Referring practitioner: LEANNE Hightower*  Date of Initial Visit: Type: THERAPY  Noted: 2/10/2025  Today's Date: 2025  Patient seen for 3 sessions             Subjective   Estelle Ruby reports: she believes she has a \"pinched nerve\" in right shoulder blade. Pt states she has been experiencing this pain for about three days. Pt states she has been \"pushing it\" to be ready for her trip to [facility] on . Pt states she has a MD appt on  and expected to be released from the sling then. She is currently sleeping with her sling off and taking it off occasionally through the day. Pt notes she has caught herself reaching for items with the right hand and has been trying to feed herself with the right arm.     Pain: 6/10 sharp pain located in the right shoulder blade. Pt stats that she did not take pain medication before her appointment because she doesn't want to drive after taking them. Pt was advised that driving in a sling is also impaired driving but she states she has no one to drive her; she is only driving to come to PT; she hasn't even gone to the grocery. Pt states she is mostly taking pain medication before bed.     Objective     See Exercise, Manual, and Modality Logs for complete treatment.     Assessment/Plan  Pt was educated of the dangers of driving impaired (wearing a sling and/or on pain medications.) Pt was also educated on the importance of pain control to reduce guarding which is likely contributing to the sharp nerve like pains near the scapula. Pt states demonstrated that she has been actively flexing her arm without assistance; pt educated that she is beginning the phase of active assisted movements and this could be causing increased pain. PTA offered alternatives such as " hands clasped flexion or table slides. ROM is progressing well.     Progress per Plan of Care      Timed:  Manual Therapy:    23     mins  70253;  Therapeutic Exercise:    0     mins  90864;     Neuromuscular Cassidy:    0    mins  41387;    Therapeutic Activity:     15     mins  29104;     Self care:                       0     mins  88922;  Gait Trainin     mins  51971;       Ultrasound:                    0     mins  10367;      Untimed:  Mechanical Traction:    0     mins  58178;   Electrical Stimulation:    0     mins  31466 ( );      Timed Treatment:   38   mins   Total Treatment:     38   mins      Electronically signed:   Marline Greer PTA  Physical Therapist Assistant  [facility] License #: [id number]

## 2025-02-24 ENCOUNTER — TREATMENT (OUTPATIENT)
Dept: PHYSICAL THERAPY | Facility: CLINIC | Age: 65
End: 2025-02-24
Payer: MEDICARE

## 2025-02-24 DIAGNOSIS — M25.511 ACUTE PAIN OF RIGHT SHOULDER: ICD-10-CM

## 2025-02-24 DIAGNOSIS — M25.611 DECREASED RIGHT SHOULDER RANGE OF MOTION: ICD-10-CM

## 2025-02-24 DIAGNOSIS — R29.898 WEAKNESS OF RIGHT SHOULDER: ICD-10-CM

## 2025-02-24 DIAGNOSIS — Z96.611 S/P REVERSE TOTAL SHOULDER ARTHROPLASTY, RIGHT: Primary | ICD-10-CM

## 2025-02-24 NOTE — PROGRESS NOTES
Physical Therapy Daily Treatment Note  [facility], [address], [place] [address]      Patient: Estelle Ruby   : 1960  Referring practitioner: LEANNE Hightower*  Date of Initial Visit: Type: THERAPY  Noted: 2/10/2025  Today's Date: 2025  Patient seen for 4 sessions           Subjective     Objective   See Exercise, Manual, and Modality Logs for complete treatment.       Assessment & Plan       Assessment  Assessment details: Pt cont to have great difficulty with relaxing with manual. There is difficulty with all exercises and pt reports not having any difficulty with table slides at home. There is Tenderness with PROM but pt is more afraid of her UE being dropped and is guarded.          Visit Diagnoses:    ICD-10-CM ICD-9-CM   1. S/P reverse total shoulder arthroplasty, right  Z96.611 V43.61   2. Weakness of right shoulder  R29.898 781.99   3. Acute pain of right shoulder  M25.511 719.41   4. Decreased right shoulder range of motion  M25.611 719.51       Progress per Plan of Care    Timed:    Therapeutic Exercise:    14     mins  91318;  Manual Therapy:    14     mins  35845;     Neuromuscular Cassidy:       mins  20287;    Therapeutic Activity:          mins  51611;     Gait Training:           mins  33788;     Ultrasound:          mins  79283;      Untimed:  Electrical Stimulation:         mins  53736 ( );  Mechanical Traction:         mins  96443;   Group           mins  89303    Timed Treatment:   28   mins   Total Treatment:     30   mins      Leeanna Hanna PTA  Physical Therapist Assistant

## 2025-02-26 ENCOUNTER — TREATMENT (OUTPATIENT)
Dept: PHYSICAL THERAPY | Facility: CLINIC | Age: 65
End: 2025-02-26
Payer: MEDICARE

## 2025-02-26 DIAGNOSIS — M25.511 ACUTE PAIN OF RIGHT SHOULDER: ICD-10-CM

## 2025-02-26 DIAGNOSIS — R29.898 WEAKNESS OF RIGHT SHOULDER: ICD-10-CM

## 2025-02-26 DIAGNOSIS — Z96.611 S/P REVERSE TOTAL SHOULDER ARTHROPLASTY, RIGHT: Primary | ICD-10-CM

## 2025-02-26 DIAGNOSIS — M25.611 DECREASED RIGHT SHOULDER RANGE OF MOTION: ICD-10-CM

## 2025-02-26 NOTE — PROGRESS NOTES
Physical Therapy Daily Treatment Note  [facility], [address], [place] [address]      Patient: Estelle Ruby   : 1960  Referring practitioner: LEANNE Hightower*  Date of Initial Visit: Type: THERAPY  Noted: 2/10/2025  Today's Date: 2025  Patient seen for 5 sessions           Subjective Evaluation    History of Present Illness    Subjective comment: 5/10 in the R LD       Objective   See Exercise, Manual, and Modality Logs for complete treatment.       Assessment & Plan       Assessment  Assessment details: Pt is reporting moderate pain at tx time. There is lower tolerance to wall slides and pt is still unable to fully relax with manual. All tx was tolerated without issue.          Visit Diagnoses:    ICD-10-CM ICD-9-CM   1. S/P reverse total shoulder arthroplasty, right  Z96.611 V43.61   2. Weakness of right shoulder  R29.898 781.99   3. Acute pain of right shoulder  M25.511 719.41   4. Decreased right shoulder range of motion  M25.611 719.51       Progress per Plan of Care    Timed:    Therapeutic Exercise:    12     mins  06341;  Manual Therapy:    18     mins  77056;     Neuromuscular Cassidy:       mins  49513;    Therapeutic Activity:          mins  31082;     Gait Training:           mins  57282;     Ultrasound:          mins  48179;      Untimed:  Electrical Stimulation:         mins  85362 ( );  Mechanical Traction:         mins  07681;   Group           mins  10824    Timed Treatment:   30   mins   Total Treatment:     32   mins      Leeanna Hanna PTA  Physical Therapist Assistant

## 2025-02-27 ENCOUNTER — OFFICE VISIT (OUTPATIENT)
Dept: SLEEP MEDICINE | Facility: HOSPITAL | Age: 65
End: 2025-02-27
Payer: MEDICARE

## 2025-02-27 VITALS
HEART RATE: 74 BPM | DIASTOLIC BLOOD PRESSURE: 49 MMHG | WEIGHT: 206 LBS | OXYGEN SATURATION: 99 % | HEIGHT: 62 IN | SYSTOLIC BLOOD PRESSURE: 100 MMHG | BODY MASS INDEX: 37.91 KG/M2

## 2025-02-27 DIAGNOSIS — G47.33 OSA (OBSTRUCTIVE SLEEP APNEA): Primary | ICD-10-CM

## 2025-02-27 PROCEDURE — G0463 HOSPITAL OUTPT CLINIC VISIT: HCPCS

## 2025-02-27 RX ORDER — OXYCODONE AND ACETAMINOPHEN 7.5; 325 MG/1; MG/1
TABLET ORAL
COMMUNITY
Start: 2025-02-17

## 2025-02-27 RX ORDER — HYDROCODONE BITARTRATE AND ACETAMINOPHEN 7.5; 325 MG/1; MG/1
1 TABLET ORAL
COMMUNITY
Start: 2025-02-07 | End: 2025-03-09

## 2025-02-27 RX ORDER — NAPROXEN SODIUM 220 MG/1
220 TABLET, FILM COATED ORAL
COMMUNITY

## 2025-02-27 RX ORDER — MAGNESIUM OXIDE 400 MG/1
400 TABLET ORAL DAILY
COMMUNITY

## 2025-02-27 NOTE — PROGRESS NOTES
"      [facility]         Dr Anjelica Schmidt  [unfilled]  Patient Care Team:  Elizabeth Soriano APRN as PCP - General (Nurse Practitioner)    Chief Complaint: KRISTOFER with hypertension obesity diastolic CHF    Interval History: Patient was seen by Dr. Villa last visit and prescribed a new CPAP.  Currently on auto CPAP 5 to 15 cm.  Compliance today 100% average daily use 9 hours 52 minutes.  AHI leak within normal limits.  She feels rested and benefits from CPAP.  Goes to bed 12 gets up 8 gets about 8+ hours of sleep more rested with CPAP.  Recently had shoulder replacement done and currently recovering from it.  No tobacco no alcohol no caffeine abuse.  Currently has a nasal pillow that fits well.  Supplies been adequate.  [place] 5 out of 24 within normal limits.  Review of system positive for anxiety depression nasal congestion and dry mouth occasionally.    REVIEW OF SYSTEMS:   CARDIOVASCULAR: No chest pain, chest pressure or chest discomfort. No palpitations or edema.   RESPIRATORY: No shortness of breath, cough or sputum.   GASTROINTESTINAL: No anorexia, nausea, vomiting or diarrhea. No abdominal pain or blood.   HEMATOLOGIC: No bleeding or bruising.     Ventilator/Non-Invasive Ventilation Settings (From admission, onward)      None              Vital Signs  Heart Rate:  [74] 74  BP: (100)/(49) 100/49  [unfilled]  Flowsheet Rows      Flowsheet Row First Filed Value   Admission Height 157.5 cm (62.01\") Documented at 02/27/2025 1400   Admission Weight 93.4 kg (206 lb) Documented at 02/27/2025 1400                 Physical Exam:  Patient is examined using the personal protective equipment as per guidelines from infection control for this particular patient as enacted.  Hand hygiene was performed before and after patient interaction.   General Appearance:    Alert, cooperative, in no acute distress.  Following simple commands  ENT Mallampati between 3 and 4 no nasal congestion  Neck midline trachea, no " thyromegaly   Lungs:     Clear to auscultation, respirations regular, even and                  unlabored    Heart:    Regular rhythm and normal rate, normal S1 and S2, no            murmur, no gallop, no rub, no click   Chest Wall:    No abnormalities observed   Abdomen:     Normal bowel sounds, no masses, no organomegaly, soft        nontender, nondistended, no guarding, no rebound                tenderness   Extremities:   Moves all extremities well, no edema, no cyanosis, no             redness  CNS no focal neurological deficits normal sensory exam  Skin no rashes no nodules  Musculoskeletal no cyanosis no clubbing normal range of motion     Results Review:                                          I reviewed the patient's new clinical results.  I personally viewed and interpreted the patient's chest x-ray.        Medication Review:       No current facility-administered medications for this visit.      ASSESSMENT:   1. KRISTOFER on CPAP  G47.33 327.23   2. Sleep related hypoxia  G47.34 327.24   3. Obesity (BMI 30-39.9)  E66.9 278.00   4. Type 2 diabetes mellitus with hypoglycemia without coma, with long-term current use of insulin  E11.649 250.80     Z79.4 251.2       V58.67   5. Chronic diastolic heart failure  I50.32 428.32   6. Benign essential HTN          PLAN:  Reviewed compliance download with the patient  Compliance AHI leak look excellent  Continue current CPAP 515 cm  Sleep hygiene measures to continue  Correlation between sleep apnea current comorbidities also discussed  Weight loss encouraged  We will follow-up in 1 year      Anjelica Schmidt MD  02/27/25  14:27 EST

## 2025-03-03 ENCOUNTER — TREATMENT (OUTPATIENT)
Dept: PHYSICAL THERAPY | Facility: CLINIC | Age: 65
End: 2025-03-03
Payer: MEDICARE

## 2025-03-03 DIAGNOSIS — I10 BENIGN ESSENTIAL HTN: ICD-10-CM

## 2025-03-03 DIAGNOSIS — R29.898 WEAKNESS OF RIGHT SHOULDER: ICD-10-CM

## 2025-03-03 DIAGNOSIS — Z96.611 S/P REVERSE TOTAL SHOULDER ARTHROPLASTY, RIGHT: Primary | ICD-10-CM

## 2025-03-03 DIAGNOSIS — M25.611 DECREASED RIGHT SHOULDER RANGE OF MOTION: ICD-10-CM

## 2025-03-03 DIAGNOSIS — M25.511 ACUTE PAIN OF RIGHT SHOULDER: ICD-10-CM

## 2025-03-03 RX ORDER — METOPROLOL SUCCINATE 50 MG/1
50 TABLET, EXTENDED RELEASE ORAL EVERY EVENING
Qty: 90 TABLET | Refills: 3 | Status: SHIPPED | OUTPATIENT
Start: 2025-03-03

## 2025-03-03 NOTE — PROGRESS NOTES
Physical Therapy Daily Treatment Note  [facility], [address], [place] [address]      Patient: Estelle Ruby   : 1960  Referring practitioner: LEANNE Hightower*  Date of Initial Visit: Type: THERAPY  Noted: 2/10/2025  Today's Date: 3/3/2025  Patient seen for 6 sessions           Subjective Evaluation    History of Present Illness    Subjective comment: 4/10 in the R LD       Objective   See Exercise, Manual, and Modality Logs for complete treatment.       Assessment & Plan       Assessment  Assessment details: Pt is still wearing her sling to therapy and is to see the MD next week. There is good ROM seen when performed manual but the strength to get the same range in sitting is not there yet. Pt was advised to cont to address AROM in flexion at home several times throughout the day.          Visit Diagnoses:    ICD-10-CM ICD-9-CM   1. S/P reverse total shoulder arthroplasty, right  Z96.611 V43.61   2. Weakness of right shoulder  R29.898 781.99   3. Acute pain of right shoulder  M25.511 719.41   4. Decreased right shoulder range of motion  M25.611 719.51       Progress per Plan of Care    Timed:    Therapeutic Exercise:    12     mins  42063;  Manual Therapy:    2     mins  27841;     Neuromuscular Cassidy:       mins  01114;    Therapeutic Activity:     14     mins  72038;     Gait Training:           mins  37883;     Ultrasound:          mins  22274;      Untimed:  Electrical Stimulation:         mins  96422 (MC );  Mechanical Traction:         mins  28372;   Group           mins  76187    Timed Treatment:   28   mins   Total Treatment:     30   mins      Leeanna Hanna PTA  Physical Therapist Assistant

## 2025-03-05 ENCOUNTER — TREATMENT (OUTPATIENT)
Dept: PHYSICAL THERAPY | Facility: CLINIC | Age: 65
End: 2025-03-05
Payer: MEDICARE

## 2025-03-05 DIAGNOSIS — M25.611 DECREASED RIGHT SHOULDER RANGE OF MOTION: ICD-10-CM

## 2025-03-05 DIAGNOSIS — R29.898 WEAKNESS OF RIGHT SHOULDER: ICD-10-CM

## 2025-03-05 DIAGNOSIS — M25.511 ACUTE PAIN OF RIGHT SHOULDER: ICD-10-CM

## 2025-03-05 DIAGNOSIS — Z96.611 S/P REVERSE TOTAL SHOULDER ARTHROPLASTY, RIGHT: Primary | ICD-10-CM

## 2025-03-05 NOTE — PROGRESS NOTES
Physical Therapy Daily Treatment Note  [facility], [address], [place] [address]      Patient: Estelle Ruby   : 1960  Referring practitioner: LEANNE Hightower*  Date of Initial Visit: Type: THERAPY  Noted: 2/10/2025  Today's Date: 3/5/2025  Patient seen for 7 sessions           Subjective Evaluation    History of Present Illness    Subjective comment: 3-4/10 in the R SHLD       Objective   See Exercise, Manual, and Modality Logs for complete treatment.       Assessment & Plan       Assessment  Assessment details: Pt is doing well with all the new activities and is not c/o an increase in pain or discomfort. There is a little ROM lacking with manual in flexion and ABD. Pt is to be leaving town starting tomorrow and with rejoin us when she returns.           Visit Diagnoses:    ICD-10-CM ICD-9-CM   1. S/P reverse total shoulder arthroplasty, right  Z96.611 V43.61   2. Weakness of right shoulder  R29.898 781.99   3. Acute pain of right shoulder  M25.511 719.41   4. Decreased right shoulder range of motion  M25.611 719.51       Progress per Plan of Care    Timed:    Therapeutic Exercise:    10     mins  53206;  Manual Therapy:    8     mins  66111;     Neuromuscular Cassidy:       mins  44216;    Therapeutic Activity:     10     mins  36035;     Gait Training:           mins  79481;     Ultrasound:          mins  63252;      Untimed:  Electrical Stimulation:         mins  86721 ( );  Mechanical Traction:         mins  10147;   Group           mins  37038    Timed Treatment:   28   mins   Total Treatment:     30   mins      Leeanna Hanna PTA  Physical Therapist Assistant

## 2025-03-17 ENCOUNTER — TREATMENT (OUTPATIENT)
Dept: PHYSICAL THERAPY | Facility: CLINIC | Age: 65
End: 2025-03-17
Payer: MEDICARE

## 2025-03-17 DIAGNOSIS — M25.611 DECREASED RIGHT SHOULDER RANGE OF MOTION: ICD-10-CM

## 2025-03-17 DIAGNOSIS — M25.511 ACUTE PAIN OF RIGHT SHOULDER: ICD-10-CM

## 2025-03-17 DIAGNOSIS — Z96.611 S/P REVERSE TOTAL SHOULDER ARTHROPLASTY, RIGHT: Primary | ICD-10-CM

## 2025-03-17 DIAGNOSIS — R29.898 WEAKNESS OF RIGHT SHOULDER: ICD-10-CM

## 2025-03-17 NOTE — PROGRESS NOTES
Re-Assessment / Re-Certification  [facility], [address], [place] [address]      Patient: Estelle Ruby   : 1960  Diagnosis/ICD-10 Code:  S/P reverse total shoulder arthroplasty, right [Z96.611]  Referring practitioner: LEANNE Hightower*  Date of Initial Visit: Type: THERAPY  Noted: 2/10/2025  Today's Date: 3/17/2025  Patient seen for 8 sessions      Subjective:     Visit Diagnoses:    ICD-10-CM ICD-9-CM   1. S/P reverse total shoulder arthroplasty, right  Z96.611 V43.61   2. Weakness of right shoulder  R29.898 781.99   3. Acute pain of right shoulder  M25.511 719.41   4. Decreased right shoulder range of motion  M25.611 719.51       Clinical Progress: improved  Home Program Compliance: Yes  Treatment has included: therapeutic exercise, neuromuscular re-education, manual therapy, and therapeutic activity      Subjective Evaluation    History of Present Illness  Mechanism of injury: Patient's pain today is 3/10.    Overall, patient feels that PT has been helpful for her. She notes an improvement of 40% since starting therapy.    Patient is still having difficulty with mopping the floors, grocery shopping, and her stamina is still very low.    She retuned back to the doctor 3/3/25, released from the sling.  She was out of town all last week, did ok with the shoulder.            Objective           General Comments     Shoulder Comments   R shoulder PROM // AROM:  Flexion: 120 degrees // 125 degrees  External rotation: 40 degrees // 20 degrees  Abduction: 130 degrees // 90 degrees  Internal rotation: 45 degrees // R back pocket    R shoulder MMT  Flexion: 3+/5  Extension: 4-/5  Abduction: 3/5  External rotation: 3+/5  Internal rotation: 3+/5    Radicular symptoms:  some numbness still in the R hand    Tenderness: R scapula, deltoid, upper trap    Incision: healing nicely         Assessment & Plan       Assessment  Impairments: abnormal muscle firing, abnormal or restricted ROM, activity  intolerance, impaired physical strength, lacks appropriate home exercise program, pain with function and safety issue   Functional limitations: carrying objects, lifting, sleeping, pulling, pushing, uncomfortable because of pain, reaching behind back, reaching overhead and unable to perform repetitive tasks   Assessment details: Estelle is showing some improvements with the R shoulder.  She is 9 weeks post op this week.  Her AROM is starting to improve.  Strength is showing some gains, but increased fatigue and weakness still noted.  Updated HEP today, added in some band work, to tolerance.  Scar tissue is present, recommended working out some tightness, gentle to tolerance.  She is progressing with her goals.  Patient will continue to benefit from further PT services to address their remaining deficits noted and progress to achieve their goals.         Goals  Plan Goals: SHOULDER  PROBLEMS:     1. The patient has limited ROM of the right shoulder.    LTG 1: 12 weeks:  The patient will demonstrate 160 degrees of right shoulder flexion, 160 degrees of shoulder abduction, 70 degrees of shoulder external rotation, and 70 degrees of shoulder internal rotation to allow the patient to reach into upper kitchen cabinets and manipulate clothing behind the back with greater ease.    STATUS:  Progressing   STG 1a: 4 weeks:  The patient will demonstrate 120 degrees of right shoulder flexion, 120 degrees of shoulder abduction, 45 degrees of shoulder external rotation, and 45 degrees of shoulder internal rotation.    STATUS:  Progressing   TREATMENT: Manual therapy, therapeutic exercise, home exercise instruction, and modalities as needed to include: electrical stimulation, ultrasound, moist heat, and ice.    2. The patient has limited strength of the right shoulder.   LTG 2: 12 weeks:  The patient will demonstrate 4+ /5 strength for right shoulder flexion, abduction, external rotation, and internal rotation in order to demonstrate  improved shoulder stability.    STATUS:  Progressing   STG 2a: 4 weeks:  The patient will demonstrate 4- /5 strength for right shoulder flexion, abduction, external rotation, and internal rotation.    STATUS:  Progressing   STG2b:  4 weeks:  The patient will be independent with home exercises.     STATUS:  Progressing   TREATMENT: Manual therapy, therapeutic exercise, home exercise instruction, and modalities as needed to include: electrical stimulation, ultrasound, moist heat, and ice.     3. The patient complains of pain to the right shoulder.   LTG 3: 12 weeks:  The patient will report a pain rating of 1 /10 or better in order to improve sleep quality and tolerance to performance of activities of daily living.    STATUS:  Progressing   STG 3a: 4 weeks:  The patient will report a pain rating of 2 /10 or better.     STATUS:  Progressing   TREATMENT: Manual therapy, therapeutic exercise, home exercise instruction, and modalities as needed to include: electrical stimulation, ultrasound, moist heat, and ice.    4. Carrying, Moving, and Handling Objects Functional Limitation     LTG 4: 12 weeks:  The patient will demonstrate 40 % limitation by achieving a score on the QuickDASH.    STATUS:  Progressing   STG 4a: 4 weeks:  The patient will demonstrate 60 % limitation by achieving a score on the QuickDASH.      STATUS:  Progressing   TREATMENT:  Manual therapy, therapeutic exercise, home exercise instruction, and modalities as needed to include: moist heat, electrical stimulation, and ultrasound.      Plan  Therapy options: will be seen for skilled therapy services  Planned modality interventions: cryotherapy, dry needling, electrical stimulation/Russian stimulation, TENS, thermotherapy (hydrocollator packs) and ultrasound  Planned therapy interventions: manual therapy, flexibility, functional ROM exercises, home exercise program, therapeutic activities, stretching, strengthening, soft tissue mobilization, postural  training, neuromuscular re-education, body mechanics training, joint mobilization, ADL retraining, fine motor coordination training, abdominal trunk stabilization and balance/weight-bearing training  Frequency: 2x week  Duration in weeks: 8  Treatment plan discussed with: patient  Plan details: Progress with R shoulder ROM, decrease pain levels, improve strength, improve scapular stabilization, postural awareness, increased stamina, decreased tightness, improved ROM/flexibility, improve fine motor and coordination, education as needed.          Progress toward previous goals: Partially Met  Recommendations: Continue as planned  Timeframe: 2 months  Prognosis to achieve goals: good    Timed:  Manual Therapy:    13     mins  25980;  Therapeutic Exercise:    15     mins  41012;     Neuromuscular Cassidy:       mins  56132;    Therapeutic Activity:     10     mins  72468;     Gait Training:           mins  16086;     Ultrasound:                          mins  63808;  Self Care:           mins  59805          Un-timed:  Electrical Stimulation:         mins  59269 ( );  Dry Needling          mins self-pay;  Mechanical Traction           mins  69157;  Low Eval          mins 23181;  Moderate Eval          mins 36847;  High Eval          mins 06594;  Re-Eval     8     mins 87297;  Canalith Repos         mins 21680      Timed Treatment:   38   mins   Total Treatment:     46   mins        PT SIGNATURE: Elizabeth Rodriguez PT, DPT  [place] License: [id number]       Certification Period: 3/17/2025 thru 6/14/2025  I certify that the therapy services are furnished while this patient is under my care.  The services outlined above are required by this patient, and will be reviewed every 90 days.     PHYSICIAN: Kuldeep Cason PA-C  NPI: [id number]      PHYSICIAN PRINT NAME: ______________________________________________      PHYSICIAN SIGNATURE:  ______________________________________________         DATE:________________________________        Please sign and return via fax to [phone].  Thank you, [facility].

## 2025-04-02 ENCOUNTER — TRANSCRIBE ORDERS (OUTPATIENT)
Dept: ADMINISTRATIVE | Facility: HOSPITAL | Age: 65
End: 2025-04-02
Payer: MEDICARE

## 2025-04-02 DIAGNOSIS — Z12.2 ENCOUNTER FOR SCREENING FOR LUNG CANCER: ICD-10-CM

## 2025-04-02 DIAGNOSIS — Z87.891 PERSONAL HISTORY OF NICOTINE DEPENDENCE: ICD-10-CM

## 2025-04-02 DIAGNOSIS — F17.200 CURRENT SMOKER: Primary | ICD-10-CM

## 2025-04-07 ENCOUNTER — TREATMENT (OUTPATIENT)
Dept: PHYSICAL THERAPY | Facility: CLINIC | Age: 65
End: 2025-04-07
Payer: MEDICARE

## 2025-04-07 DIAGNOSIS — M25.511 ACUTE PAIN OF RIGHT SHOULDER: ICD-10-CM

## 2025-04-07 DIAGNOSIS — Z96.611 S/P REVERSE TOTAL SHOULDER ARTHROPLASTY, RIGHT: Primary | ICD-10-CM

## 2025-04-07 DIAGNOSIS — M25.611 DECREASED RIGHT SHOULDER RANGE OF MOTION: ICD-10-CM

## 2025-04-07 DIAGNOSIS — R29.898 WEAKNESS OF RIGHT SHOULDER: ICD-10-CM

## 2025-04-07 NOTE — PROGRESS NOTES
Physical Therapy Daily Treatment Note  [facility], [address], [place] [address]      Patient: Estelle Ruby   : 1960  Referring practitioner: LEANNE Hightower*  Date of Initial Visit: Type: THERAPY  Noted: 2/10/2025  Today's Date: 2025  Patient seen for 9 sessions           Visit Diagnoses:    ICD-10-CM ICD-9-CM   1. S/P reverse total shoulder arthroplasty, right  Z96.611 V43.61   2. Weakness of right shoulder  R29.898 781.99   3. Acute pain of right shoulder  M25.511 719.41   4. Decreased right shoulder range of motion  M25.611 719.51       Subjective Evaluation    History of Present Illness    Subjective comment: She has been sick with COVID the last few weeks.  2/10 pain today in the R shoulder.  She was sore after using the mower (riding wtih a sterring wheel, used the L arm) and it was sore some, no pain with weedeating.         Objective           General Comments     Shoulder Comments   R shoulder AROM:  Flexion: 150 degrees  External rotation: 45 degrees   Abduction: 180 degrees   Internal rotation: R back pocket     See Exercise, Manual, and Modality Logs for complete treatment.       Assessment & Plan       Assessment  Assessment details: Increased resistance with strengthening work today, moved up to 2 lbs to patient's tolerance. Pt states she is feeling better.  Measured her AROM today, improvements are being noticed.  May progress patient to only 1 x week after her next appt or potentially discharge.  Patient is not consistent with HEP.  Will need to review/update next visit.           Progress per Plan of Care and Progress strengthening /stabilization /functional activity             Timed:  Manual Therapy:    5     mins  52683;  Therapeutic Exercise:    15     mins  91419;     Neuromuscular Cassidy:       mins  40326;    Therapeutic Activity:     10     mins  60869;     Gait Training:           mins  87232;     Ultrasound:                          mins  33305;  Self Care:            mins  86914          Un-timed:  Electrical Stimulation:         mins  37256 ( );  Dry Needling          mins self-pay;  Mechanical Traction           mins  33240;  Low Eval          mins 44477;  Moderate Eval          mins 83246;  High Eval          mins 21654;  Re-Eval          mins 26741;  Canalith Repos        mins 32092      Timed Treatment:   30   mins   Total Treatment:     33   mins    Elizabeth Rodriguez PT, DPT  [place] License #: [id number]

## 2025-04-09 ENCOUNTER — HOSPITAL ENCOUNTER (OUTPATIENT)
Dept: CT IMAGING | Facility: HOSPITAL | Age: 65
Discharge: HOME | End: 2025-04-09
Admitting: NURSE PRACTITIONER
Payer: MEDICARE

## 2025-04-09 DIAGNOSIS — Z12.2 ENCOUNTER FOR SCREENING FOR LUNG CANCER: ICD-10-CM

## 2025-04-09 DIAGNOSIS — Z87.891 PERSONAL HISTORY OF NICOTINE DEPENDENCE: ICD-10-CM

## 2025-04-09 DIAGNOSIS — F17.200 CURRENT SMOKER: ICD-10-CM

## 2025-04-09 PROCEDURE — 71271 CT THORAX LUNG CANCER SCR C-: CPT

## 2025-04-10 ENCOUNTER — TREATMENT (OUTPATIENT)
Dept: PHYSICAL THERAPY | Facility: CLINIC | Age: 65
End: 2025-04-10
Payer: MEDICARE

## 2025-04-10 DIAGNOSIS — Z96.611 S/P REVERSE TOTAL SHOULDER ARTHROPLASTY, RIGHT: Primary | ICD-10-CM

## 2025-04-10 DIAGNOSIS — M25.511 ACUTE PAIN OF RIGHT SHOULDER: ICD-10-CM

## 2025-04-10 DIAGNOSIS — M25.611 DECREASED RIGHT SHOULDER RANGE OF MOTION: ICD-10-CM

## 2025-04-10 DIAGNOSIS — R29.898 WEAKNESS OF RIGHT SHOULDER: ICD-10-CM

## 2025-04-10 NOTE — PROGRESS NOTES
Re-Assessment / Re-Certification  [facility], [address], [place] [address]      Patient: Estelle Ruby   : 1960  Diagnosis/ICD-10 Code:  S/P reverse total shoulder arthroplasty, right [Z96.611]  Referring practitioner: LEANNE Hightower*  Date of Initial Visit: Type: THERAPY  Noted: 2/10/2025  Today's Date: 4/10/2025  Patient seen for 10 sessions      Subjective:     Visit Diagnoses:    ICD-10-CM ICD-9-CM   1. S/P reverse total shoulder arthroplasty, right  Z96.611 V43.61   2. Weakness of right shoulder  R29.898 781.99   3. Acute pain of right shoulder  M25.511 719.41   4. Decreased right shoulder range of motion  M25.611 719.51       Home Program Compliance: No  Treatment has included: therapeutic exercise, neuromuscular re-education, manual therapy, and therapeutic activity      Subjective Evaluation    History of Present Illness  Mechanism of injury: Patient's pain today is 4/10.  The pain increases some after the exercises.    Overall, patient feels that PT has been helpful for her. She notes an improvement of 50% since starting therapy.    Patient has noted improvements with using the restroom and being able to put on a coat, reaching up.     Patient is still having difficulty with reaching backwards, pulling on the covers, mopping.                Objective          Strength/Myotome Testing     Right Shoulder     Planes of Motion   Flexion: 4-   Extension: 4   Abduction: 4-   External rotation at 0°: 4-   Internal rotation at 0°: 4-      General Comments     Shoulder Comments   R shoulder AROM:  Flexion: 150 degrees  External rotation: 45 degrees   Abduction: 180 degrees   Internal rotation: R back pocket    Tenderness: lateral border of scapula on the R, R posterior and lateral upper arm       Assessment & Plan       Assessment  Impairments: abnormal muscle firing, abnormal or restricted ROM, activity intolerance, impaired physical strength, lacks appropriate home exercise program,  pain with function and safety issue   Functional limitations: carrying objects, lifting, sleeping, pulling, pushing, uncomfortable because of pain, reaching behind back, reaching overhead and unable to perform repetitive tasks   Assessment details: Estelle has only atteneded a few session since the last progress note due to travel and COVID.  She has had some progress with the R shoulder ROM, but still limited.  Her strength is also a deficit in all directions.  She is having a lot of pain with trying to reach (and pull covers over her in bed, mopping).  She is progressing with her goals.  Progressing activities in therapy to help with those movements.  Updated new stretches today.  Patient will continue to benefit from further PT services to address their remaining deficits noted and progress to achieve their goals.         Goals  Plan Goals: SHOULDER  PROBLEMS:     1. The patient has limited ROM of the right shoulder.    LTG 1: 12 weeks:  The patient will demonstrate 160 degrees of right shoulder flexion, 160 degrees of shoulder abduction, 70 degrees of shoulder external rotation, and 70 degrees of shoulder internal rotation to allow the patient to reach into upper kitchen cabinets and manipulate clothing behind the back with greater ease.    STATUS:  Progressing   STG 1a: 4 weeks:  The patient will demonstrate 120 degrees of right shoulder flexion, 120 degrees of shoulder abduction, 45 degrees of shoulder external rotation, and 45 degrees of shoulder internal rotation.    STATUS:  MET   TREATMENT: Manual therapy, therapeutic exercise, home exercise instruction, and modalities as needed to include: electrical stimulation, ultrasound, moist heat, and ice.    2. The patient has limited strength of the right shoulder.   LTG 2: 12 weeks:  The patient will demonstrate 4+ /5 strength for right shoulder flexion, abduction, external rotation, and internal rotation in order to demonstrate improved shoulder  stability.    STATUS:  Progressing   STG 2a: 4 weeks:  The patient will demonstrate 4- /5 strength for right shoulder flexion, abduction, external rotation, and internal rotation.    STATUS:  Progressing   STG2b:  4 weeks:  The patient will be independent with home exercises.     STATUS:  Progressing   TREATMENT: Manual therapy, therapeutic exercise, home exercise instruction, and modalities as needed to include: electrical stimulation, ultrasound, moist heat, and ice.     3. The patient complains of pain to the right shoulder.   LTG 3: 12 weeks:  The patient will report a pain rating of 1 /10 or better in order to improve sleep quality and tolerance to performance of activities of daily living.    STATUS:  Progressing   STG 3a: 4 weeks:  The patient will report a pain rating of 2 /10 or better.     STATUS:  Progressing   TREATMENT: Manual therapy, therapeutic exercise, home exercise instruction, and modalities as needed to include: electrical stimulation, ultrasound, moist heat, and ice.    4. Carrying, Moving, and Handling Objects Functional Limitation     LTG 4: 12 weeks:  The patient will demonstrate 40 % limitation by achieving a score on the QuickDASH.    STATUS:  Progressing   STG 4a: 4 weeks:  The patient will demonstrate 60 % limitation by achieving a score on the QuickDASH.      STATUS: MET   TREATMENT:  Manual therapy, therapeutic exercise, home exercise instruction, and modalities as needed to include: moist heat, electrical stimulation, and ultrasound.      Plan  Therapy options: will be seen for skilled therapy services  Planned modality interventions: cryotherapy, dry needling, electrical stimulation/Russian stimulation, TENS, thermotherapy (hydrocollator packs) and ultrasound  Planned therapy interventions: manual therapy, flexibility, functional ROM exercises, home exercise program, therapeutic activities, stretching, strengthening, soft tissue mobilization, postural training, neuromuscular  re-education, body mechanics training, joint mobilization, ADL retraining, fine motor coordination training, abdominal trunk stabilization and balance/weight-bearing training  Frequency: 1x week  Duration in weeks: 4  Treatment plan discussed with: patient  Plan details: Progress with R shoulder ROM, decrease pain levels, improve strength, improve scapular stabilization, postural awareness, increased stamina, decreased tightness, improved ROM/flexibility, improve fine motor and coordination, education as needed.    Try 1 x week with HEP.  Spoke to patient about consistency and carry over with HEP for further gains.  Space out 10-15 min/day to help get started into a routine.           Progress toward previous goals: Partially Met      Recommendations: Continue as planned  Timeframe: 1 month  Prognosis to achieve goals: good    Timed:  Manual Therapy:    8    mins  15925;  Therapeutic Exercise:    15     mins  46442;     Neuromuscular Cassidy:       mins  22214;    Therapeutic Activity:          mins  24264;     Gait Training:           mins  22937;     Ultrasound:                          mins  15511;  Self Care:           mins  69871          Un-timed:  Electrical Stimulation:         mins  47617 ( );  Dry Needling          mins self-pay;  Mechanical Traction           mins  02526;  Low Eval          mins 58084;  Moderate Eval          mins 35231;  High Eval          mins 82484;  Re-Eval     8     mins 01601;  Canalith Repos         mins 20973      Timed Treatment:   23   mins   Total Treatment:     31   mins        PT SIGNATURE: Elizabeth Rodriguez PT, DPT  [place] License: [id number]        Certification Period: 4/10/2025 thru 7/8/2025  I certify that the therapy services are furnished while this patient is under my care.  The services outlined above are required by this patient, and will be reviewed every 90 days.     PHYSICIAN: Kuldeep Cason PA-C  NPI: [id number]      PHYSICIAN PRINT NAME:  ______________________________________________      PHYSICIAN SIGNATURE: ______________________________________________         DATE:________________________________        Please sign and return via fax to [phone].  Thank you, [facility].

## 2025-04-15 ENCOUNTER — TELEPHONE (OUTPATIENT)
Age: 65
End: 2025-04-15
Payer: MEDICARE

## 2025-04-15 NOTE — TELEPHONE ENCOUNTER
Caller: Estelle Ruby    Relationship to patient: Self    Best call back number: [phone] (home)      Chief complaint: PATIENT HAD A CT LUNG SCAN ON 4/9/25 THAT SHOWED CALCIFICATION OF THE AORTA     Type of visit: FOLLOW UP     Requested date:AS ADVISED         Additional notes:PATIENT AS APPOINTMENT IN JUNE PLEASE ADVISE IF IT NEEDS TO BE SOONER

## 2025-04-16 NOTE — TELEPHONE ENCOUNTER
Would recommend she take an 81 mg aspirin daily, unless contraindicated, and continue her current doses of lovastatin and omega-3 fish oil to help control cholesterol levels due to the presence of coronary calcifications.  Unless she has new symptoms of shortness of breath or chest pain/ pressure, no need to move her appointment up.  We can do a CT calcium score to help stratify her risk if she is asymptomatic.  If she would like we can go ahead and order that we can have it done before her follow-up in June.

## 2025-04-16 NOTE — TELEPHONE ENCOUNTER
GRACE patient. Went over results, Patient is currently taking 81 mg aspirin already. Patient is agreeable to CTA Calcium Score.    Please order.

## 2025-04-17 DIAGNOSIS — I25.10 CORONARY ARTERY CALCIFICATION: Primary | ICD-10-CM

## 2025-04-18 ENCOUNTER — TREATMENT (OUTPATIENT)
Dept: PHYSICAL THERAPY | Facility: CLINIC | Age: 65
End: 2025-04-18
Payer: MEDICARE

## 2025-04-18 DIAGNOSIS — R29.898 WEAKNESS OF RIGHT SHOULDER: ICD-10-CM

## 2025-04-18 DIAGNOSIS — M25.611 DECREASED RIGHT SHOULDER RANGE OF MOTION: ICD-10-CM

## 2025-04-18 DIAGNOSIS — Z96.611 S/P REVERSE TOTAL SHOULDER ARTHROPLASTY, RIGHT: Primary | ICD-10-CM

## 2025-04-18 DIAGNOSIS — M25.511 ACUTE PAIN OF RIGHT SHOULDER: ICD-10-CM

## 2025-04-18 NOTE — PROGRESS NOTES
Physical Therapy Daily Treatment Note  [facility], [address], [place] [address]      Patient: Estelle Ruby   : 1960  Referring practitioner: LEANNE Hightower*  Date of Initial Visit: Type: THERAPY  Noted: 2/10/2025  Today's Date: 2025  Patient seen for 11 sessions           Visit Diagnoses:    ICD-10-CM ICD-9-CM   1. S/P reverse total shoulder arthroplasty, right  Z96.611 V43.61   2. Weakness of right shoulder  R29.898 781.99   3. Acute pain of right shoulder  M25.511 719.41   4. Decreased right shoulder range of motion  M25.611 719.51       Subjective Evaluation    History of Present Illness    Subjective comment: 3/10 pain today.  She was able to mop her floors and she was able to rake up shells out by her tree.         Objective   See Exercise, Manual, and Modality Logs for complete treatment.       Assessment & Plan       Assessment  Assessment details: Estelle is tolerating more activities with less pain.  ROM continues to improve each week.  Fatigue noted with wall ABCs, but pt able to perform.  Continue to use ice after HEP and activities around the house, as needed.           Progress per Plan of Care and Progress strengthening /stabilization /functional activity             Timed:  Manual Therapy:    12     mins  65818;  Therapeutic Exercise:    10     mins  90841;     Neuromuscular Cassidy:       mins  98366;    Therapeutic Activity:     8     mins  51439;     Gait Training:           mins  47393;     Ultrasound:                          mins  61063;  Self Care:           mins  48393          Un-timed:  Electrical Stimulation:         mins  06323 ( );  Dry Needling          mins self-pay;  Mechanical Traction           mins  24697;  Low Eval          mins 69482;  Moderate Eval          mins 25759;  High Eval          mins 45190;  Re-Eval          mins 49416;  Canalith Repos         mins 87876      Timed Treatment:   30   mins   Total Treatment:     30   mins    Elizabeth  Jennifer PT, DPT  [place] License #: [id number]

## 2025-04-21 ENCOUNTER — TREATMENT (OUTPATIENT)
Dept: PHYSICAL THERAPY | Facility: CLINIC | Age: 65
End: 2025-04-21
Payer: MEDICARE

## 2025-04-21 DIAGNOSIS — M25.611 DECREASED RIGHT SHOULDER RANGE OF MOTION: ICD-10-CM

## 2025-04-21 DIAGNOSIS — R29.898 WEAKNESS OF RIGHT SHOULDER: ICD-10-CM

## 2025-04-21 DIAGNOSIS — Z96.611 S/P REVERSE TOTAL SHOULDER ARTHROPLASTY, RIGHT: Primary | ICD-10-CM

## 2025-04-21 DIAGNOSIS — M25.511 ACUTE PAIN OF RIGHT SHOULDER: ICD-10-CM

## 2025-04-21 PROCEDURE — 97110 THERAPEUTIC EXERCISES: CPT | Performed by: PHYSICAL THERAPIST

## 2025-04-21 PROCEDURE — 97530 THERAPEUTIC ACTIVITIES: CPT | Performed by: PHYSICAL THERAPIST

## 2025-04-21 NOTE — PROGRESS NOTES
"Outpatient Physical Therapy                   Physical Therapy Daily Treatment Note    Patient: Estelle Ruby   : 1960  Diagnosis/ICD-10 Code:  S/P reverse total shoulder arthroplasty, right [Z96.611]  Referring practitioner: LEANNE Hightower*  Date of Initial Visit: Type: THERAPY  Noted: 2/10/2025  Today's Date: 2025  Patient seen for 12 sessions             Subjective   Estelle Ruby reports: she has noticed in the last week that her hands fall asleep when on the computer after 20 about minutes. When it occurs it is one hand at a time but has occurred on both sides. Pt is able to change the position of her arms or stop using the mouse in that hand and it goes away. Pt states she is driving with her left hand only and it will \"fall asleep\" then too. Pt notes that she did \"heavy stuff\" last week which included sweeping, mopping and raking nut shells then carrying them in a bucket.     Pain: 0/10 pain, at time of arrival.     Objective     See Exercise, Manual, and Modality Logs for complete treatment.     Assessment/Plan  Pt reports having pain with PROM due to a catching sensation in her right shoulder although there was little to no resistance until end range. Pt would benefit from skilled PT to address Range of Motion  and Strength deficits, pain management and any concerns with ADLs.     Progress per Plan of Care      Timed:  Manual Therapy:    6     mins  09720;  Therapeutic Exercise:    14     mins  42526;     Neuromuscular Cassidy:    0    mins  77964;    Therapeutic Activity:     10     mins  43755;     Self care:                       0     mins  08510;  Gait Trainin     mins  23437;       Ultrasound:                    0     mins  31809;      Untimed:  Mechanical Traction:    0     mins  24889;   Electrical Stimulation:    0     mins  72689 ( );      Timed Treatment:   30   mins   Total Treatment:     30   mins      Electronically signed:   Marline Greer PTA  Physical Therapist " Assistant  [place] PTA License #: [id number]

## 2025-04-28 ENCOUNTER — TREATMENT (OUTPATIENT)
Dept: PHYSICAL THERAPY | Facility: CLINIC | Age: 65
End: 2025-04-28
Payer: MEDICARE

## 2025-04-28 DIAGNOSIS — R29.898 WEAKNESS OF RIGHT SHOULDER: ICD-10-CM

## 2025-04-28 DIAGNOSIS — Z96.611 S/P REVERSE TOTAL SHOULDER ARTHROPLASTY, RIGHT: Primary | ICD-10-CM

## 2025-04-28 DIAGNOSIS — M25.611 DECREASED RIGHT SHOULDER RANGE OF MOTION: ICD-10-CM

## 2025-04-28 DIAGNOSIS — M25.511 ACUTE PAIN OF RIGHT SHOULDER: ICD-10-CM

## 2025-04-28 PROCEDURE — 97140 MANUAL THERAPY 1/> REGIONS: CPT | Performed by: PHYSICAL THERAPIST

## 2025-04-28 PROCEDURE — 97110 THERAPEUTIC EXERCISES: CPT | Performed by: PHYSICAL THERAPIST

## 2025-04-28 NOTE — PROGRESS NOTES
Physical Therapy Daily Treatment Note  [facility], [address], [place] [address]      Patient: Estelle Ruby   : 1960  Referring practitioner: LEANNE Hightower*  Date of Initial Visit: Type: THERAPY  Noted: 2/10/2025  Today's Date: 2025  Patient seen for 13 sessions           Visit Diagnoses:    ICD-10-CM ICD-9-CM   1. S/P reverse total shoulder arthroplasty, right  Z96.611 V43.61   2. Weakness of right shoulder  R29.898 781.99   3. Acute pain of right shoulder  M25.511 719.41   4. Decreased right shoulder range of motion  M25.611 719.51       Subjective Evaluation    History of Present Illness    Subjective comment: 0/10 pain today.  She has a lot of projects she wants to do at home.  She thinks she is ready to be discharged.         Objective          Strength/Myotome Testing     Right Shoulder     Planes of Motion   Flexion: 4   Extension: 4   Abduction: 4-   External rotation at 0°: 4-   Internal rotation at 0°: 4-      General Comments     Shoulder Comments   R shoulder AROM:  Flexion: 160 degrees  External rotation: 50 degrees   Abduction: 150 degrees   Internal rotation: R back pocket    Tenderness:none    Numbness in B hands, but there before surgery     See Exercise, Manual, and Modality Logs for complete treatment.       Assessment & Plan       Assessment  Functional limitations: reaching behind back and reaching overhead   Assessment details: Estelle will be discharged from PT at this time.  She thinks she is doing better, not having any pain, and has home projects she wants to do.  Reviewed HEP.  She declined needing a handout.  ROM has improved, still lacking some end ranges in all directions.  She feels functional in all aspects of her ADLs without issues.         Goals  Plan Goals: SHOULDER  PROBLEMS:     1. The patient has limited ROM of the right shoulder.    LTG 1: 12 weeks:  The patient will demonstrate 160 degrees of right shoulder flexion, 160 degrees of shoulder  abduction, 70 degrees of shoulder external rotation, and 70 degrees of shoulder internal rotation to allow the patient to reach into upper kitchen cabinets and manipulate clothing behind the back with greater ease.    STATUS:  Not met   STG 1a: 4 weeks:  The patient will demonstrate 120 degrees of right shoulder flexion, 120 degrees of shoulder abduction, 45 degrees of shoulder external rotation, and 45 degrees of shoulder internal rotation.    STATUS:  MET   TREATMENT: Manual therapy, therapeutic exercise, home exercise instruction, and modalities as needed to include: electrical stimulation, ultrasound, moist heat, and ice.    2. The patient has limited strength of the right shoulder.   LTG 2: 12 weeks:  The patient will demonstrate 4+ /5 strength for right shoulder flexion, abduction, external rotation, and internal rotation in order to demonstrate improved shoulder stability.    STATUS:  Not met   STG 2a: 4 weeks:  The patient will demonstrate 4- /5 strength for right shoulder flexion, abduction, external rotation, and internal rotation.    STATUS:  MET   STG2b:  4 weeks:  The patient will be independent with home exercises.     STATUS:  MET   TREATMENT: Manual therapy, therapeutic exercise, home exercise instruction, and modalities as needed to include: electrical stimulation, ultrasound, moist heat, and ice.     3. The patient complains of pain to the right shoulder.   LTG 3: 12 weeks:  The patient will report a pain rating of 1 /10 or better in order to improve sleep quality and tolerance to performance of activities of daily living.    STATUS:  MET   STG 3a: 4 weeks:  The patient will report a pain rating of 2 /10 or better.     STATUS:  MET   TREATMENT: Manual therapy, therapeutic exercise, home exercise instruction, and modalities as needed to include: electrical stimulation, ultrasound, moist heat, and ice.    4. Carrying, Moving, and Handling Objects Functional Limitation     LTG 4: 12 weeks:  The  patient will demonstrate 40 % limitation by achieving a score on the QuickDASH.    STATUS:  Progressing   STG 4a: 4 weeks:  The patient will demonstrate 60 % limitation by achieving a score on the QuickDASH.      STATUS: MET   TREATMENT:  Manual therapy, therapeutic exercise, home exercise instruction, and modalities as needed to include: moist heat, electrical stimulation, and ultrasound.      Plan  Treatment plan discussed with: patient  Plan details: Discharge, continue with HEP                   Timed:  Manual Therapy:    16     mins  00425;  Therapeutic Exercise:    10     mins  05158;     Neuromuscular Cassidy:       mins  20477;    Therapeutic Activity:          mins  22405;     Gait Training:           mins  89002;     Ultrasound:                          mins  04774;  Self Care:           mins  19026          Un-timed:  Electrical Stimulation:         mins  01603 ( );  Dry Needling          mins self-pay;  Mechanical Traction           mins  61670;  Low Eval          mins 19755;  Moderate Eval          mins 60444;  High Eval          mins 65620;  Re-Eval          mins 17899;  Canalith Repos         mins 61859      Timed Treatment:   26   mins   Total Treatment:     28   mins    Elizabeth Rodriguez PT, DPT  [place] License #: [id number]

## 2025-04-29 ENCOUNTER — HOSPITAL ENCOUNTER (OUTPATIENT)
Dept: CT IMAGING | Facility: HOSPITAL | Age: 65
Discharge: HOME | End: 2025-04-29
Admitting: FAMILY MEDICINE

## 2025-04-29 DIAGNOSIS — I25.10 CORONARY ARTERY CALCIFICATION: ICD-10-CM

## 2025-04-29 PROCEDURE — 75571 CT HRT W/O DYE W/CA TEST: CPT

## 2025-05-05 ENCOUNTER — TELEPHONE (OUTPATIENT)
Age: 65
End: 2025-05-05
Payer: MEDICARE

## 2025-05-05 NOTE — TELEPHONE ENCOUNTER
Patient called wanting result from Holter monitor, stated the result were in he MyChart and she had some concerns. I explained the doctor had not signed off on the results yet and she would get a call as soon as he did.

## 2025-05-09 ENCOUNTER — TELEPHONE (OUTPATIENT)
Dept: CARDIOLOGY | Facility: CLINIC | Age: 65
End: 2025-05-09
Payer: MEDICARE

## 2025-05-09 ENCOUNTER — RESULTS FOLLOW-UP (OUTPATIENT)
Dept: CARDIOLOGY | Facility: CLINIC | Age: 65
End: 2025-05-09
Payer: MEDICARE

## 2025-05-09 RX ORDER — LOVASTATIN 40 MG/1
40 TABLET ORAL
Qty: 90 TABLET | Refills: 3 | Status: SHIPPED | OUTPATIENT
Start: 2025-05-09

## 2025-05-09 RX ORDER — ASPIRIN 81 MG/1
81 TABLET ORAL DAILY
Start: 2025-05-09

## 2025-05-09 NOTE — TELEPHONE ENCOUNTER
GRACE patient. Went over results and recommendations. Patient states she does have SOA with exertion but is able to wait until f/u in June. Patient advised to call the office if any new or worsening symptoms occur.     Patient verbalized understanding and appreciation. Prescriptions sent to preferred pharmacy.

## 2025-05-23 ENCOUNTER — PATIENT MESSAGE (OUTPATIENT)
Dept: CARDIOLOGY | Facility: CLINIC | Age: 65
End: 2025-05-23
Payer: MEDICARE

## 2025-05-23 DIAGNOSIS — I50.32 CHRONIC DIASTOLIC HEART FAILURE: Primary | ICD-10-CM

## 2025-05-23 DIAGNOSIS — I10 BENIGN ESSENTIAL HTN: ICD-10-CM

## 2025-05-27 NOTE — TELEPHONE ENCOUNTER
Encouraged her to work on dietary modifications to also help control cholesterol, she may continue to lower dose of lovastatin 20 mg nightly for now.  Please place orders for CMP and lipid to be drawn a few days before her next appointment, and we can reevaluate at that time.

## 2025-06-04 ENCOUNTER — LAB (OUTPATIENT)
Dept: LAB | Facility: HOSPITAL | Age: 65
End: 2025-06-04
Payer: MEDICARE

## 2025-06-04 DIAGNOSIS — I50.32 CHRONIC DIASTOLIC HEART FAILURE: ICD-10-CM

## 2025-06-04 LAB
ALBUMIN SERPL-MCNC: 4.2 G/DL (ref 3.5–5.2)
ALBUMIN/GLOB SERPL: 1.2 G/DL
ALP SERPL-CCNC: 56 U/L (ref 39–117)
ALT SERPL W P-5'-P-CCNC: 18 U/L (ref 1–33)
ANION GAP SERPL CALCULATED.3IONS-SCNC: 9.8 MMOL/L (ref 5–15)
AST SERPL-CCNC: 21 U/L (ref 1–32)
BILIRUB SERPL-MCNC: 0.3 MG/DL (ref 0–1.2)
BUN SERPL-MCNC: 21 MG/DL (ref 8–23)
BUN/CREAT SERPL: 20.4 (ref 7–25)
CALCIUM SPEC-SCNC: 9.6 MG/DL (ref 8.6–10.5)
CHLORIDE SERPL-SCNC: 98 MMOL/L (ref 98–107)
CHOLEST SERPL-MCNC: 138 MG/DL (ref 0–200)
CO2 SERPL-SCNC: 25.2 MMOL/L (ref 22–29)
CREAT SERPL-MCNC: 1.03 MG/DL (ref 0.57–1)
EGFRCR SERPLBLD CKD-EPI 2021: 60.5 ML/MIN/1.73
GLOBULIN UR ELPH-MCNC: 3.6 GM/DL
GLUCOSE SERPL-MCNC: 159 MG/DL (ref 65–99)
HDLC SERPL-MCNC: 30 MG/DL (ref 40–60)
LDLC SERPL CALC-MCNC: 81 MG/DL (ref 0–100)
LDLC/HDLC SERPL: 2.55 {RATIO}
POTASSIUM SERPL-SCNC: 4.8 MMOL/L (ref 3.5–5.2)
PROT SERPL-MCNC: 7.8 G/DL (ref 6–8.5)
SODIUM SERPL-SCNC: 133 MMOL/L (ref 136–145)
TRIGL SERPL-MCNC: 157 MG/DL (ref 0–150)
VLDLC SERPL-MCNC: 27 MG/DL (ref 5–40)

## 2025-06-04 PROCEDURE — 80053 COMPREHEN METABOLIC PANEL: CPT

## 2025-06-04 PROCEDURE — 80061 LIPID PANEL: CPT

## 2025-06-04 PROCEDURE — 36415 COLL VENOUS BLD VENIPUNCTURE: CPT

## 2025-06-05 ENCOUNTER — RESULTS FOLLOW-UP (OUTPATIENT)
Dept: CARDIOLOGY | Facility: CLINIC | Age: 65
End: 2025-06-05
Payer: MEDICARE

## 2025-06-13 ENCOUNTER — TRANSCRIBE ORDERS (OUTPATIENT)
Dept: ADMINISTRATIVE | Facility: HOSPITAL | Age: 65
End: 2025-06-13
Payer: MEDICARE

## 2025-06-13 DIAGNOSIS — Z12.31 ENCOUNTER FOR SCREENING MAMMOGRAM FOR BREAST CANCER: Primary | ICD-10-CM

## 2025-06-18 ENCOUNTER — OFFICE VISIT (OUTPATIENT)
Age: 65
End: 2025-06-18
Payer: MEDICARE

## 2025-06-18 VITALS
SYSTOLIC BLOOD PRESSURE: 110 MMHG | DIASTOLIC BLOOD PRESSURE: 76 MMHG | OXYGEN SATURATION: 96 % | HEART RATE: 72 BPM | BODY MASS INDEX: 37.49 KG/M2 | WEIGHT: 205 LBS

## 2025-06-18 DIAGNOSIS — I10 BENIGN ESSENTIAL HTN: ICD-10-CM

## 2025-06-18 DIAGNOSIS — I50.32 CHRONIC DIASTOLIC HEART FAILURE: ICD-10-CM

## 2025-06-18 DIAGNOSIS — I25.10 CORONARY ARTERY CALCIFICATION: Primary | ICD-10-CM

## 2025-06-18 DIAGNOSIS — E78.2 MIXED HYPERLIPIDEMIA: ICD-10-CM

## 2025-06-18 RX ORDER — NYSTATIN 100000 [USP'U]/ML
SUSPENSION ORAL
COMMUNITY
Start: 2025-05-22

## 2025-06-18 RX ORDER — DEXTROMETHORPHAN HYDROBROMIDE AND PROMETHAZINE HYDROCHLORIDE 15; 6.25 MG/5ML; MG/5ML
SYRUP ORAL
COMMUNITY
Start: 2025-03-20

## 2025-06-18 RX ORDER — FLUCONAZOLE 150 MG/1
150 TABLET ORAL
COMMUNITY
Start: 2025-02-28

## 2025-06-18 RX ORDER — FUROSEMIDE 20 MG/1
20 TABLET ORAL
COMMUNITY
End: 2025-06-18

## 2025-06-18 RX ORDER — IPRATROPIUM BROMIDE AND ALBUTEROL SULFATE 2.5; .5 MG/3ML; MG/3ML
3 SOLUTION RESPIRATORY (INHALATION)
COMMUNITY
Start: 2025-03-20

## 2025-06-18 RX ORDER — NYSTATIN AND TRIAMCINOLONE ACETONIDE 100000; 1 [USP'U]/G; MG/G
1 OINTMENT TOPICAL 2 TIMES DAILY
COMMUNITY
Start: 2025-02-28 | End: 2026-02-28

## 2025-06-18 RX ORDER — ATORVASTATIN CALCIUM 20 MG/1
20 TABLET, FILM COATED ORAL NIGHTLY
Qty: 90 TABLET | Refills: 3 | Status: SHIPPED | OUTPATIENT
Start: 2025-06-18

## 2025-06-18 RX ORDER — PREDNISONE 20 MG/1
1 TABLET ORAL EVERY 12 HOURS SCHEDULED
COMMUNITY
Start: 2025-03-20

## 2025-06-18 RX ORDER — NYSTATIN 100000 U/G
OINTMENT TOPICAL
COMMUNITY
Start: 2025-06-14

## 2025-06-18 RX ORDER — ONDANSETRON 4 MG/1
4 TABLET, FILM COATED ORAL
COMMUNITY
Start: 2025-01-14

## 2025-06-18 RX ORDER — HYDROCODONE BITARTRATE AND ACETAMINOPHEN 7.5; 325 MG/1; MG/1
TABLET ORAL
COMMUNITY

## 2025-06-18 RX ORDER — FERROUS SULFATE 325(65) MG
325 TABLET, DELAYED RELEASE (ENTERIC COATED) ORAL
COMMUNITY
Start: 2025-04-16

## 2025-06-21 PROBLEM — E78.2 MIXED HYPERLIPIDEMIA: Status: ACTIVE | Noted: 2025-06-21

## 2025-06-21 PROBLEM — I25.10 CORONARY ARTERY CALCIFICATION: Status: ACTIVE | Noted: 2025-06-21

## 2025-06-21 NOTE — PROGRESS NOTES
CARDIOLOGY FOLLOW-UP PROGRESS NOTE        Chief Complaint  6 month f/u (Having some tingling and numbness in fingers and sweating profusely.), Hyperlipidemia, and Results (Cardiac CT)    Subjective            Estelle Ruby presents to [facility] CARDIOLOGY  History of Present Illness    Ms. Ruby is here for a routine follow-up visit.  She denies having any recent episodes of chest pain.  He has chronic shortness of breath which is stable.  She gets intermittent episodes of sweating.  No palpitations or dizziness.  Recently, she had low-dose CT scan of the chest done for lung cancer screening.  Incidentally noted to have coronary artery calcification.  She also had a CT coronary calcium scoring done which showed a score of more than 1000.  There is a mention of aortic valvular calcification as well.  She is very concerned about the finding of her aortic valvular calcification and wants to discuss it in detail and also to decide whether she needs valve surgery.      Past History:    Chronic diastolic heart failure.  COPD/asthma  Obstructive sleep apnea on CPAP  Mixed hyperlipidemia  Essential hypertension  Diabetes mellitus    Medical History:  Past Medical History:   Diagnosis Date    Acid reflux     Arthritis     Asthma     Depression     Diabetes mellitus     Heart valve disease     Hyperlipidemia     Hypertension     Mental disorder     Anxiety, Depression    Shortness of breath     Sinusitis     Sleep apnea 2002    Urinary tract infection        Family History: family history includes Cancer in her father; Hypertension in her father and mother; Stroke in her mother.     Social History: reports that she quit smoking about 34 years ago. Her smoking use included cigarettes. She started smoking about 49 years ago. She has a 37.5 pack-year smoking history. She has never used smokeless tobacco. She reports that she does not currently use alcohol. She reports that she does not use drugs.    Allergies:  Pioglitazone, Reclast [zoledronic acid], Sulfa antibiotics, and Sulfasalazine    Current Outpatient Medications on File Prior to Visit   Medication Sig    albuterol sulfate  (90 Base) MCG/ACT inhaler Inhale 2 puffs Every 6 (Six) Hours As Needed.    ascorbic acid (VITAMIN C) 1000 MG tablet Take 1 tablet by mouth Daily.    budesonide-formoterol (SYMBICORT) 160-4.5 MCG/ACT inhaler Inhale 2 puffs.    bumetanide (BUMEX) 0.5 MG tablet Take 1 tablet by mouth Every Other Day for 360 days.    Calcium-Magnesium-Vitamin D - MG-MG-UNIT tablet sustained-release 24 hour Take  by mouth.    cholecalciferol (VITAMIN D3) 25 MCG (1000 UT) tablet Take 1 tablet by mouth Daily.    clonazePAM (KlonoPIN) 0.5 MG tablet Take 1 tablet by mouth Daily As Needed.    cyclobenzaprine (FLEXERIL) 10 MG tablet Take 1 tablet by mouth 3 (Three) Times a Day As Needed.    diphenhydrAMINE (BENADRYL) 25 mg capsule Take 1 capsule by mouth Every 6 (Six) Hours As Needed.    DULoxetine (CYMBALTA) 30 MG capsule Take 1 capsule by mouth Daily.    ferrous sulfate 325 (65 FE) MG EC tablet Take 1 tablet by mouth.    fluconazole (DIFLUCAN) 150 MG tablet Take 1 tablet by mouth.    FLUoxetine HCl, PMDD, 20 MG tablet Take 20 mg by mouth Daily.    fluticasone (VERAMYST) 27.5 MCG/SPRAY nasal spray Administer 2 sprays into the nostril(s) as directed by provider Daily.    gabapentin (NEURONTIN) 300 MG capsule TAKE 1 CAPSULE BY MOUTH TWICE DAILY. MAX DAILY AMOUNT: 600 MG    HYDROcodone-acetaminophen (NORCO) 7.5-325 MG per tablet TAKE 1 TABLET BY MOUTH TWICE DAILY AS NEEDED FOR PAIN . DO NOT EXCEED 2 PER 24 HOURS    ipratropium-albuterol (DUO-NEB) 0.5-2.5 mg/3 ml nebulizer Inhale 3 mL.    Lantus SoloStar 100 UNIT/ML injection pen Inject 26 Units under the skin into the appropriate area as directed Daily.    loratadine (CLARITIN) 10 MG tablet Take 1 tablet by mouth.    losartan (COZAAR) 100 MG tablet Take 1 tablet by mouth Daily.    magnesium oxide (MAG-OX)  400 MG tablet Take 1 tablet by mouth Daily.    MAGNESIUM PO Take  by mouth.    melatonin 3 MG tablet Take 10 mg by mouth Daily.    metoprolol succinate XL (TOPROL-XL) 50 MG 24 hr tablet TAKE 1 TABLET BY MOUTH ONCE DAILY IN THE EVENING    nystatin (MYCOSTATIN) 100,000 unit/mL suspension TAKE 5ML(S) BY MOUTH FOUR TIMES DAILY FOR 7 DAYS. USE 48 HOURS AFTER SYMPTOMS RESOLVE    nystatin (MYCOSTATIN) 632021 UNIT/GM ointment     nystatin-triamcinolone (MYCOLOG) 495337-0.1 UNIT/GM-% ointment Apply 1 Application topically to the appropriate area as directed 2 (Two) Times a Day.    Omega-3 Fatty Acids (fish oil) 1000 MG capsule capsule Take  by mouth.    omeprazole (priLOSEC) 20 MG capsule Take 1 capsule by mouth Daily.    Synjardy 12.5-1000 MG tablet Take 1 tablet by mouth 2 (Two) Times a Day Before Meals.    traZODone (DESYREL) 100 MG tablet Take 75 mg by mouth every night at bedtime.    vitamin B-12 (CYANOCOBALAMIN) 100 MCG tablet Take  by mouth.    FLUoxetine (PROzac) 20 MG capsule Take 1 capsule by mouth Every Morning. (Patient not taking: Reported on 6/18/2025)    ondansetron (ZOFRAN) 4 MG tablet Take 1 tablet by mouth. (Patient not taking: Reported on 6/18/2025)    oxyCODONE-acetaminophen (PERCOCET) 7.5-325 MG per tablet Take one tablet by mouth every 6-8 hours prn pain. (Patient not taking: Reported on 6/18/2025)    predniSONE (DELTASONE) 20 MG tablet Take 1 tablet by mouth Every 12 (Twelve) Hours. (Patient not taking: Reported on 6/18/2025)    promethazine-dextromethorphan (PROMETHAZINE-DM) 6.25-15 MG/5ML syrup TAKE 5 ML BY MOUTH ONCE DAILY AT BEDTIME AND 2.5-4 HOURS LATER TAKE TAKE 5 MLS AGAIN (Patient not taking: Reported on 6/18/2025)    Soliqua 100-33 UNT-MCG/ML solution pen-injector injection INJECT 30 UNITS SUBCUTANEOUSLY ONCE DAILY WITH BREAKFAST (30 MINUTES PRIOR TO FIRST MEAL OF THE DAY.)    topiramate (TOPAMAX) 25 MG tablet Take 2 tablets by mouth Daily.     No current facility-administered medications on  file prior to visit.          Review of Systems   Respiratory:  Positive for shortness of breath. Negative for cough and wheezing.    Cardiovascular:  Negative for chest pain, palpitations and leg swelling.   Gastrointestinal:  Negative for nausea and vomiting.   Neurological:  Negative for dizziness and syncope.        Objective     /76 (BP Location: Left arm, Patient Position: Sitting, Cuff Size: Large Adult)   Pulse 72   Wt 93 kg (205 lb)   SpO2 96%   BMI 37.49 kg/m²       Physical Exam    General : Alert, awake, no acute distress  Neck : Supple, no carotid bruit, no jugular venous distention  CVS : Regular rate and rhythm, no murmur, rubs or gallops  Lungs: Clear to auscultation bilaterally, no crackles or rhonchi  Abdomen: Soft, nontender, bowel sounds heard in all 4 quadrants  Extremities: Warm, well-perfused, no pedal edema    Result Review :     The following data was reviewed by: Bassem Rodriguez MD on 06/18/2025:    CMP          11/11/2024    12:44 12/2/2024    12:07 6/4/2025    10:08   CMP   Glucose 190  173  159    BUN 23  19  21.0    Creatinine 1.48  1.24  1.03    EGFR 39.1  48.4  60.5    Sodium 134  131  133    Potassium 4.9  5.1  4.8    Chloride 98  96  98    Calcium 9.5  9.4  9.6    Total Protein 8.5   7.8    Albumin 4.0   4.2    Globulin 4.5   3.6    Total Bilirubin 0.2   0.3    Alkaline Phosphatase 68   56    AST (SGOT) 26   21    ALT (SGPT) 22   18    Albumin/Globulin Ratio 0.9   1.2    BUN/Creatinine Ratio 15.5  15.3  20.4    Anion Gap 13.1  11.8  9.8      CBC          9/25/2024    15:49   CBC   WBC 10.56       RBC 4.79       Hemoglobin 12.6       Hematocrit 40.8       MCV 85.2       MCH 26.3       MCHC 30.9       RDW 16.8       Platelets 361          Details          This result is from an external source.               Lipid Panel          6/4/2025    10:08   Lipid Panel   Total Cholesterol 138    Triglycerides 157    HDL Cholesterol 30    VLDL Cholesterol 27    LDL Cholesterol  81     LDL/HDL Ratio 2.55           Data reviewed: Cardiology studies        No results found for this or any previous visit.    CT coronary calcium scoring done on 4/17/2025 showed    Findings:  Agatston scores for individual coronary arteries are as follows:  Left main (LM): 1.2  Left anterior descending (LAD): 457.7  Left circumflex (CX): 668.9  Right coronary artery (RCA): 0  Total 1127.8     The visualized lungs are clear.     IMPRESSION:  Impression:  Extensive plaque burden.               Assessment and Plan        Diagnoses and all orders for this visit:    1. Coronary artery calcification (Primary)  Assessment & Plan:  Extensive coronary artery calcification involving LAD and Lcx arteries.  Findings discussed in detail with the patient.  She does not have any chest pain.  She has chronic mild shortness of breath which is stable.  She had an echocardiogram done in 2023 which was unremarkable.  A SPECT stress test done in the same year did not show any evidence of reversible ischemia.  Further ischemic evaluation is not needed since she is asymptomatic.  We will continue aspirin, will intensify statin therapy.    Ms. Ruby is very concerned about the mentioning of aortic valvular calcification on the previous CT scan.  I explained to the patient that this is of less importance, especially since echocardiogram done in 2023 showed no aortic stenosis.  Coronary artery calcification is more important and needs to be addressed with medical therapy.  We will also repeat echo now to reevaluate the status of aortic valve and LV function.    Orders:  -     Adult Transthoracic Echo Complete W/ Cont if Necessary Per Protocol; Future    2. Mixed hyperlipidemia  Assessment & Plan:  Goal LDL < 70, due to presence of extensive coronary artery calcification.  We will change lovastatin to atorvastatin 20 mg nightly per guidelines.  Will repeat lipid panel before next visit.    Orders:  -     atorvastatin (LIPITOR) 20 MG tablet;  Take 1 tablet by mouth Every Night.  Dispense: 90 tablet; Refill: 3    3. Benign essential HTN  Assessment & Plan:  Blood pressure well-controlled.  Continue losartan and metoprolol without changes.      4. Chronic diastolic heart failure  Assessment & Plan:  Clinic note volume overloaded, NYHA class II symptoms.  Labs done earlier this year showed a creatinine of 1.0, which is significantly better than before.  Sodium is 133, at baseline.  Continue low-dose Bumex.                Follow Up     Return in about 6 months (around 12/18/2025) for Next scheduled follow up, with ELEUTERIO Mccabe.    Patient was given instructions and counseling regarding her condition or for health maintenance advice. Please see specific information pulled into the AVS if appropriate.

## 2025-06-21 NOTE — ASSESSMENT & PLAN NOTE
Goal LDL < 70, due to presence of extensive coronary artery calcification.  We will change lovastatin to atorvastatin 20 mg nightly per guidelines.  Will repeat lipid panel before next visit.

## 2025-06-21 NOTE — ASSESSMENT & PLAN NOTE
Extensive coronary artery calcification involving LAD and Lcx arteries.  Findings discussed in detail with the patient.  She does not have any chest pain.  She has chronic mild shortness of breath which is stable.  She had an echocardiogram done in 2023 which was unremarkable.  A SPECT stress test done in the same year did not show any evidence of reversible ischemia.  Further ischemic evaluation is not needed since she is asymptomatic.  We will continue aspirin, will intensify statin therapy.    Ms. Ruby is very concerned about the mentioning of aortic valvular calcification on the previous CT scan.  I explained to the patient that this is of less importance, especially since echocardiogram done in 2023 showed no aortic stenosis.  Coronary artery calcification is more important and needs to be addressed with medical therapy.  We will also repeat echo now to reevaluate the status of aortic valve and LV function.

## 2025-06-21 NOTE — ASSESSMENT & PLAN NOTE
Clinic note volume overloaded, NYHA class II symptoms.  Labs done earlier this year showed a creatinine of 1.0, which is significantly better than before.  Sodium is 133, at baseline.  Continue low-dose Bumex.

## 2025-06-30 ENCOUNTER — TELEPHONE (OUTPATIENT)
Dept: CARDIOLOGY | Facility: CLINIC | Age: 65
End: 2025-06-30

## 2025-06-30 NOTE — TELEPHONE ENCOUNTER
Caller: Estelle Ruby    Relationship: Self    Best call back number:   Telephone Information:   Mobile [phone]         Caller requesting test results: PATIENT    What test was performed: ECHO    When was the test performed: 6-24-25    Where was the test performed: [facility]

## 2025-07-15 ENCOUNTER — HOSPITAL ENCOUNTER (OUTPATIENT)
Dept: MAMMOGRAPHY | Facility: HOSPITAL | Age: 65
Discharge: HOME | End: 2025-07-15
Admitting: NURSE PRACTITIONER
Payer: MEDICARE

## 2025-07-15 DIAGNOSIS — Z12.31 ENCOUNTER FOR SCREENING MAMMOGRAM FOR BREAST CANCER: ICD-10-CM

## 2025-07-15 PROCEDURE — 77067 SCR MAMMO BI INCL CAD: CPT

## 2025-07-15 PROCEDURE — 77063 BREAST TOMOSYNTHESIS BI: CPT

## 2025-07-16 ENCOUNTER — TRANSCRIBE ORDERS (OUTPATIENT)
Dept: ADMINISTRATIVE | Facility: HOSPITAL | Age: 65
End: 2025-07-16
Payer: MEDICARE

## 2025-07-16 DIAGNOSIS — R14.0 ABDOMINAL DISTENTION: Primary | ICD-10-CM

## 2025-07-30 ENCOUNTER — HOSPITAL ENCOUNTER (OUTPATIENT)
Dept: ULTRASOUND IMAGING | Facility: HOSPITAL | Age: 65
Discharge: HOME | End: 2025-07-30
Admitting: NURSE PRACTITIONER
Payer: MEDICARE

## 2025-07-30 DIAGNOSIS — R14.0 ABDOMINAL DISTENTION: ICD-10-CM

## 2025-07-30 PROCEDURE — 76705 ECHO EXAM OF ABDOMEN: CPT
